# Patient Record
Sex: FEMALE | Race: BLACK OR AFRICAN AMERICAN | Employment: FULL TIME | URBAN - METROPOLITAN AREA
[De-identification: names, ages, dates, MRNs, and addresses within clinical notes are randomized per-mention and may not be internally consistent; named-entity substitution may affect disease eponyms.]

---

## 2017-11-03 PROBLEM — R10.2 PELVIC PAIN IN FEMALE: Status: ACTIVE | Noted: 2017-11-03

## 2017-11-03 PROBLEM — N94.6 DYSMENORRHEA: Status: ACTIVE | Noted: 2017-11-03

## 2018-06-21 PROBLEM — N76.0 BACTERIAL VAGINOSIS: Status: ACTIVE | Noted: 2018-06-21

## 2018-06-21 PROBLEM — B96.89 BACTERIAL VAGINOSIS: Status: ACTIVE | Noted: 2018-06-21

## 2018-06-21 PROBLEM — Z01.419 WOMEN'S ANNUAL ROUTINE GYNECOLOGICAL EXAMINATION: Status: ACTIVE | Noted: 2018-06-21

## 2018-07-10 PROBLEM — R10.31 INGUINAL PAIN, RIGHT: Status: ACTIVE | Noted: 2018-07-10

## 2018-07-10 PROBLEM — B96.89 BACTERIAL VAGINOSIS: Status: RESOLVED | Noted: 2018-06-21 | Resolved: 2018-07-10

## 2018-07-10 PROBLEM — N76.0 BACTERIAL VAGINOSIS: Status: RESOLVED | Noted: 2018-06-21 | Resolved: 2018-07-10

## 2018-08-21 PROBLEM — R10.31 INGUINAL PAIN, RIGHT: Status: RESOLVED | Noted: 2018-07-10 | Resolved: 2018-08-21

## 2018-09-13 ENCOUNTER — HOSPITAL ENCOUNTER (OUTPATIENT)
Dept: SURGERY | Age: 38
Discharge: HOME | End: 2018-09-13

## 2018-09-13 VITALS — WEIGHT: 209 LBS | BODY MASS INDEX: 29.26 KG/M2 | HEIGHT: 71 IN

## 2018-09-13 RX ORDER — NORETHINDRONE ACETATE AND ETHINYL ESTRADIOL 1; .02 MG/1; MG/1
TABLET ORAL DAILY
COMMUNITY
End: 2018-09-13

## 2018-09-13 NOTE — PERIOP NOTES
Patient verified name, , and surgery as listed in [facility]. Type 2 surgery, phone assessment complete. Orders not received. Labs per surgeon: no orders in EMR at this time  Labs per anesthesia protocol: hgb needed- Pt instructed to go to outpatient lab at Entrance B for blood draw Mon-Fri [id number] prior to day of surgery. Pt verbalizes understanding. Order placed in EMR on hold for arrival.    Patient answered medical/surgical history questions at their best of ability. All prior to admission medications documented in [facility]. Patient instructed to take the following medications the day of surgery according to anesthesia guidelines with a small sip of water: zantac, zoloft, junel, wellbutrin, tramadol PRN. Hold all vitamins 7 days prior to surgery and NSAIDS 5 days prior to surgery. Medications to be held- motrin. Patient instructed on the following:  Arrive at A Entrance, time of arrival to be called the day before by 1700  NPO after midnight including gum, mints, and ice chips  Responsible adult must drive patient to the hospital, stay during surgery, and patient will need supervision 24 hours after anesthesia  Use antibacterial soap in shower the night before surgery and on the morning of surgery  Leave all valuables (money and jewelry) at home but bring insurance card and ID on DOS  Do not wear make-up, nail polish, lotions, cologne, perfumes, powders, or oil on skin. Patient teach back successful and patient demonstrates knowledge of instruction.

## 2018-09-14 ENCOUNTER — HOSPITAL ENCOUNTER (OUTPATIENT)
Dept: LAB | Age: 38
Discharge: HOME | End: 2018-09-14
Payer: COMMERCIAL

## 2018-09-14 LAB — HGB BLD-MCNC: 12.6 G/DL (ref 11.7–15.4)

## 2018-09-14 PROCEDURE — 36415 COLL VENOUS BLD VENIPUNCTURE: CPT

## 2018-09-14 PROCEDURE — 85018 HEMOGLOBIN: CPT

## 2018-09-14 NOTE — PERIOP NOTES
Hgb result within anesthesia guidelines. Chart filed.     Recent Results (from the past 12 hour(s))   HEMOGLOBIN    Collection Time: 09/14/18 10:45 AM   Result Value Ref Range    HGB 12.6 11.7 - 15.4 g/dL

## 2018-09-17 NOTE — H&P
[facility] H&P      Assessment/Plan    Problem List  Date Reviewed: 2018          Codes Class    Women's annual routine gynecological examination ICD-10-CM: O88.807  ICD-9-CM: V72.31     Overview Signed 2018 11:40 AM by Julissa Mc MD     Pap wnl in 2017             Pelvic pain in female ICD-10-CM: R10.2  ICD-9-CM: 625.9     Overview Addendum 2018  2:59 PM by Julissa Mc MD     D/W pt that option for next step of treatment would be increase in E2 of OCP vs. Dx lap  Pt desires more definitive evaluation/treatment and would like to proceed with Dx lap    D/W pt at length risks/benefits of procedure including but not limited to death, bleeding, infection and damage to other internal organs. She exhibited full understanding and wishes to proceed. Dysmenorrhea ICD-10-CM: N94.6  ICD-9-CM: 625.3     Overview Addendum 2018  3:09 PM by Julissa Mc MD     18:  Improved with OCPs - will cont  18: Has gotten worse again  Encouraged pt to start taking ibuprofen about 1-2 days before menses begins to decrease bleeding and cramping by potentially 50%. Sanjeev Fernández Michele 41 y.o. presents today for surgical evaluation/treatment of the condition noted above. She is without any new complaints or issues.     OB History    Para Term  AB Living   2 2 2   2   SAB TAB Ectopic Molar Multiple Live Births        2      # Outcome Date GA Lbr Fahad/2nd Weight Sex Delivery Anes PTL Lv   2 Term 11   7 lb 15 oz (3.6 kg) M Vag-Spont EPI N HUSSAIN   1 Term 04/10/01   7 lb 9 oz (3.43 kg) M Vag-Spont EPI N HUSSAIN          Past Medical History:   Diagnosis Date    Allergic rhinitis     Calculus of kidney     Depression     GERD (gastroesophageal reflux disease)     managed with diet but has medication PRN    Hypercalcemia     Nausea & vomiting     occasionally    UTI (urinary tract infection)        Past Surgical History: Procedure Laterality Date    ENDOMETRIAL CRYOABLATION      HX BUNIONECTOMY Left     HX CHOLECYSTECTOMY      HX CYST REMOVAL      left hand    HX GYN      Endometrial Ablation    HX HYSTEROSCOPY WITH ENDOMETRIAL ABLATION  2015    HX OTHER SURGICAL      1/2 liver removed- 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    HX OTHER SURGICAL      ace and screws in both legs    HX PARATHYROIDECTOMY  03/13/2018    HX PARTIAL THYROIDECTOMY Left 03/13/2018    HX TONSIL AND ADENOIDECTOMY      HX TUBAL LIGATION      HX WISDOM TEETH EXTRACTION         Family History   Problem Relation Age of Onset    Hypertension Mother     Hypertension Father     Asthma Maternal Grandmother     Breast Cancer Maternal Grandmother     Other Sister      fibroids    Colon Cancer Neg Hx     Ovarian Cancer Neg Hx     Uterine Cancer Neg Hx        Social History     Social History    Marital status:      Spouse name: N/A    Number of children: N/A    Years of education: N/A     Occupational History    Not on file.      Social History Main Topics    Smoking status: Never Smoker    Smokeless tobacco: Never Used    Alcohol use Yes      Comment: 1-2 times per month    Drug use: No    Sexual activity: Yes     Birth control/ protection: Surgical, Pill      Comment: tubal     Other Topics Concern    Caffeine Concern No    Exercise No    Seat Belt Yes    Self-Exams Yes     Social History Narrative    Abuse: Feels safe at home, no history of physical abuse, no history of sexual abuse           Allergies   Allergen Reactions    Shellfish Containing Products Itching, Nausea and Vomiting and Swelling         Review of Systems:    Constitutional: No fevers or chills     CV: No chest pain or palpatations    Resp: No SOB or cough    GI: No nausea/vomiting/diarrhea/constipation    Neuro: No HA, no seizure like activity    Skin: No rashes or lesions     : No dysuria or hematuria        Objective    Visit Vitals    LMP 08/19/2018 (Exact Date)         Physical Exam    Gen: alert and cooperative, NAD    HEENT: NCAT    CV: RRR    Resp: CTA bilat    Abd: soft, NT, NABS    EXT: trace edema bilat    Gyn: done as per prev office visit    Neuro: No focal deficits    Skin: No noted rashes or lesions       Jaspreet Osborne MD  8:53 AM  09/17/18

## 2018-09-20 ENCOUNTER — ANESTHESIA EVENT (OUTPATIENT)
Dept: SURGERY | Age: 38
End: 2018-09-20
Payer: COMMERCIAL

## 2018-09-20 ENCOUNTER — ANESTHESIA (OUTPATIENT)
Dept: SURGERY | Age: 38
End: 2018-09-20
Payer: COMMERCIAL

## 2018-09-20 ENCOUNTER — HOSPITAL ENCOUNTER (OUTPATIENT)
Age: 38
Setting detail: OUTPATIENT SURGERY
Discharge: HOME | End: 2018-09-20
Attending: OBSTETRICS & GYNECOLOGY | Admitting: OBSTETRICS & GYNECOLOGY
Payer: COMMERCIAL

## 2018-09-20 VITALS
RESPIRATION RATE: 16 BRPM | TEMPERATURE: 97.7 F | DIASTOLIC BLOOD PRESSURE: 76 MMHG | OXYGEN SATURATION: 100 % | HEART RATE: 82 BPM | SYSTOLIC BLOOD PRESSURE: 120 MMHG | BODY MASS INDEX: 27.89 KG/M2 | WEIGHT: 200 LBS

## 2018-09-20 DIAGNOSIS — R10.2 PELVIC PAIN IN FEMALE: Primary | ICD-10-CM

## 2018-09-20 DIAGNOSIS — N80.30 ENDOMETRIOSIS OF PELVIC PERITONEUM: ICD-10-CM

## 2018-09-20 LAB — HCG UR QL: NEGATIVE

## 2018-09-20 PROCEDURE — 76210000006 HC OR PH I REC 0.5 TO 1 HR: Performed by: OBSTETRICS & GYNECOLOGY

## 2018-09-20 PROCEDURE — 77030020782 HC GWN BAIR PAWS FLX 3M -B: Performed by: ANESTHESIOLOGY

## 2018-09-20 PROCEDURE — 76010000160 HC OR TIME 0.5 TO 1 HR INTENSV-TIER 1: Performed by: OBSTETRICS & GYNECOLOGY

## 2018-09-20 PROCEDURE — 77030032490 HC SLV COMPR SCD KNE COVD -B: Performed by: OBSTETRICS & GYNECOLOGY

## 2018-09-20 PROCEDURE — 76060000032 HC ANESTHESIA 0.5 TO 1 HR: Performed by: OBSTETRICS & GYNECOLOGY

## 2018-09-20 PROCEDURE — 77030035029 HC NDL INSUF VERES DISP COVD -B: Performed by: OBSTETRICS & GYNECOLOGY

## 2018-09-20 PROCEDURE — 58662 LAPAROSCOPY EXCISE LESIONS: CPT | Performed by: OBSTETRICS & GYNECOLOGY

## 2018-09-20 PROCEDURE — 77030018836 HC SOL IRR NACL ICUM -A: Performed by: OBSTETRICS & GYNECOLOGY

## 2018-09-20 PROCEDURE — 77030035051: Performed by: OBSTETRICS & GYNECOLOGY

## 2018-09-20 PROCEDURE — 77030008703 HC TU ET UNCUF COVD -A: Performed by: ANESTHESIOLOGY

## 2018-09-20 PROCEDURE — 77030035048 HC TRCR ENDOSC OPTCL COVD -B: Performed by: OBSTETRICS & GYNECOLOGY

## 2018-09-20 PROCEDURE — 74011250636 HC RX REV CODE- 250/636: Performed by: ANESTHESIOLOGY

## 2018-09-20 PROCEDURE — 74011250636 HC RX REV CODE- 250/636

## 2018-09-20 PROCEDURE — 77030031139 HC SUT VCRL2 J&J -A: Performed by: OBSTETRICS & GYNECOLOGY

## 2018-09-20 PROCEDURE — 74011000250 HC RX REV CODE- 250: Performed by: OBSTETRICS & GYNECOLOGY

## 2018-09-20 PROCEDURE — 77030011502 HC MANIP UTER ZUM ZINN -B: Performed by: OBSTETRICS & GYNECOLOGY

## 2018-09-20 PROCEDURE — 77030039425 HC BLD LARYNG TRULITE DISP TELE -A: Performed by: ANESTHESIOLOGY

## 2018-09-20 PROCEDURE — 77030008477 HC STYL SATN SLP COVD -A: Performed by: ANESTHESIOLOGY

## 2018-09-20 PROCEDURE — 77030008522 HC TBNG INSUF LAPRO STRY -B: Performed by: OBSTETRICS & GYNECOLOGY

## 2018-09-20 PROCEDURE — 74011000250 HC RX REV CODE- 250

## 2018-09-20 PROCEDURE — 81025 URINE PREGNANCY TEST: CPT

## 2018-09-20 RX ORDER — SODIUM CHLORIDE 0.9 % (FLUSH) 0.9 %
5-10 SYRINGE (ML) INJECTION AS NEEDED
Status: DISCONTINUED | OUTPATIENT
Start: 2018-09-20 | End: 2018-09-20 | Stop reason: HOSPADM

## 2018-09-20 RX ORDER — ONDANSETRON 2 MG/ML
INJECTION INTRAMUSCULAR; INTRAVENOUS AS NEEDED
Status: DISCONTINUED | OUTPATIENT
Start: 2018-09-20 | End: 2018-09-20 | Stop reason: HOSPADM

## 2018-09-20 RX ORDER — HYDROCODONE BITARTRATE AND ACETAMINOPHEN 5; 325 MG/1; MG/1
1 TABLET ORAL
Qty: 20 TAB | Refills: 0 | Status: SHIPPED | OUTPATIENT
Start: 2018-09-20 | End: 2018-11-03

## 2018-09-20 RX ORDER — SODIUM CHLORIDE, SODIUM LACTATE, POTASSIUM CHLORIDE, CALCIUM CHLORIDE 600; 310; 30; 20 MG/100ML; MG/100ML; MG/100ML; MG/100ML
75 INJECTION, SOLUTION INTRAVENOUS CONTINUOUS
Status: DISCONTINUED | OUTPATIENT
Start: 2018-09-20 | End: 2018-09-20 | Stop reason: HOSPADM

## 2018-09-20 RX ORDER — SODIUM CHLORIDE 0.9 % (FLUSH) 0.9 %
5-10 SYRINGE (ML) INJECTION EVERY 8 HOURS
Status: DISCONTINUED | OUTPATIENT
Start: 2018-09-20 | End: 2018-09-20 | Stop reason: HOSPADM

## 2018-09-20 RX ORDER — LIDOCAINE HYDROCHLORIDE 10 MG/ML
0.1 INJECTION INFILTRATION; PERINEURAL AS NEEDED
Status: DISCONTINUED | OUTPATIENT
Start: 2018-09-20 | End: 2018-09-20 | Stop reason: HOSPADM

## 2018-09-20 RX ORDER — OXYCODONE AND ACETAMINOPHEN 5; 325 MG/1; MG/1
1 TABLET ORAL AS NEEDED
Status: DISCONTINUED | OUTPATIENT
Start: 2018-09-20 | End: 2018-09-20 | Stop reason: HOSPADM

## 2018-09-20 RX ORDER — BUPIVACAINE HYDROCHLORIDE 5 MG/ML
INJECTION, SOLUTION EPIDURAL; INTRACAUDAL AS NEEDED
Status: DISCONTINUED | OUTPATIENT
Start: 2018-09-20 | End: 2018-09-20 | Stop reason: HOSPADM

## 2018-09-20 RX ORDER — FENTANYL CITRATE 50 UG/ML
INJECTION, SOLUTION INTRAMUSCULAR; INTRAVENOUS AS NEEDED
Status: DISCONTINUED | OUTPATIENT
Start: 2018-09-20 | End: 2018-09-20 | Stop reason: HOSPADM

## 2018-09-20 RX ORDER — MIDAZOLAM HYDROCHLORIDE 1 MG/ML
2 INJECTION, SOLUTION INTRAMUSCULAR; INTRAVENOUS
Status: DISCONTINUED | OUTPATIENT
Start: 2018-09-20 | End: 2018-09-20 | Stop reason: HOSPADM

## 2018-09-20 RX ORDER — DEXTROSE, SODIUM CHLORIDE, SODIUM LACTATE, POTASSIUM CHLORIDE, AND CALCIUM CHLORIDE 5; .6; .31; .03; .02 G/100ML; G/100ML; G/100ML; G/100ML; G/100ML
150 INJECTION, SOLUTION INTRAVENOUS CONTINUOUS
Status: DISCONTINUED | OUTPATIENT
Start: 2018-09-20 | End: 2018-09-20 | Stop reason: HOSPADM

## 2018-09-20 RX ORDER — HYDROMORPHONE HYDROCHLORIDE 2 MG/ML
0.5 INJECTION, SOLUTION INTRAMUSCULAR; INTRAVENOUS; SUBCUTANEOUS
Status: DISCONTINUED | OUTPATIENT
Start: 2018-09-20 | End: 2018-09-20 | Stop reason: HOSPADM

## 2018-09-20 RX ORDER — NALOXONE HYDROCHLORIDE 0.4 MG/ML
0.2 INJECTION, SOLUTION INTRAMUSCULAR; INTRAVENOUS; SUBCUTANEOUS AS NEEDED
Status: DISCONTINUED | OUTPATIENT
Start: 2018-09-20 | End: 2018-09-20 | Stop reason: HOSPADM

## 2018-09-20 RX ORDER — LIDOCAINE HYDROCHLORIDE 20 MG/ML
INJECTION, SOLUTION EPIDURAL; INFILTRATION; INTRACAUDAL; PERINEURAL AS NEEDED
Status: DISCONTINUED | OUTPATIENT
Start: 2018-09-20 | End: 2018-09-20 | Stop reason: HOSPADM

## 2018-09-20 RX ORDER — ROCURONIUM BROMIDE 10 MG/ML
INJECTION, SOLUTION INTRAVENOUS AS NEEDED
Status: DISCONTINUED | OUTPATIENT
Start: 2018-09-20 | End: 2018-09-20 | Stop reason: HOSPADM

## 2018-09-20 RX ORDER — PROPOFOL 10 MG/ML
INJECTION, EMULSION INTRAVENOUS AS NEEDED
Status: DISCONTINUED | OUTPATIENT
Start: 2018-09-20 | End: 2018-09-20 | Stop reason: HOSPADM

## 2018-09-20 RX ORDER — DEXAMETHASONE SODIUM PHOSPHATE 4 MG/ML
INJECTION, SOLUTION INTRA-ARTICULAR; INTRALESIONAL; INTRAMUSCULAR; INTRAVENOUS; SOFT TISSUE AS NEEDED
Status: DISCONTINUED | OUTPATIENT
Start: 2018-09-20 | End: 2018-09-20 | Stop reason: HOSPADM

## 2018-09-20 RX ADMIN — PROPOFOL 200 MG: 10 INJECTION, EMULSION INTRAVENOUS at 11:31

## 2018-09-20 RX ADMIN — HYDROMORPHONE HYDROCHLORIDE 0.5 MG: 2 INJECTION, SOLUTION INTRAMUSCULAR; INTRAVENOUS; SUBCUTANEOUS at 12:36

## 2018-09-20 RX ADMIN — FENTANYL CITRATE 100 MCG: 50 INJECTION, SOLUTION INTRAMUSCULAR; INTRAVENOUS at 11:31

## 2018-09-20 RX ADMIN — ROCURONIUM BROMIDE 50 MG: 10 INJECTION, SOLUTION INTRAVENOUS at 11:31

## 2018-09-20 RX ADMIN — HYDROMORPHONE HYDROCHLORIDE 0.5 MG: 2 INJECTION, SOLUTION INTRAMUSCULAR; INTRAVENOUS; SUBCUTANEOUS at 12:41

## 2018-09-20 RX ADMIN — SODIUM CHLORIDE, SODIUM LACTATE, POTASSIUM CHLORIDE, AND CALCIUM CHLORIDE: 600; 310; 30; 20 INJECTION, SOLUTION INTRAVENOUS at 11:48

## 2018-09-20 RX ADMIN — LIDOCAINE HYDROCHLORIDE 60 MG: 20 INJECTION, SOLUTION EPIDURAL; INFILTRATION; INTRACAUDAL; PERINEURAL at 11:31

## 2018-09-20 RX ADMIN — DEXAMETHASONE SODIUM PHOSPHATE 10 MG: 4 INJECTION, SOLUTION INTRA-ARTICULAR; INTRALESIONAL; INTRAMUSCULAR; INTRAVENOUS; SOFT TISSUE at 11:35

## 2018-09-20 RX ADMIN — SODIUM CHLORIDE, SODIUM LACTATE, POTASSIUM CHLORIDE, AND CALCIUM CHLORIDE 75 ML/HR: 600; 310; 30; 20 INJECTION, SOLUTION INTRAVENOUS at 10:06

## 2018-09-20 RX ADMIN — ONDANSETRON 4 MG: 2 INJECTION INTRAMUSCULAR; INTRAVENOUS at 11:35

## 2018-09-20 NOTE — PERIOP NOTES
[address] September 20, 2018       RE: Danie Miles      To Whom It May Concern,    This is to certify that Danie Miles may may return to work on Monday, 9/24/18 per Dr. Ardon Has. Please feel free to contact my office if you have any questions or concerns. Thank you for your assistance in this matter.       Sincerely,  Jacey Clancy RN/ Telephone order Dr. Ardon Has

## 2018-09-20 NOTE — OP NOTES
Joshua Spain    1980        Preop Dx:   1. Pelvic pain    2. Dysmenorrhea      Postop Dx:   Same    3. Endometriosis with suspected adenomyosis      Procedure:   1. Diagnostic laparoscopy    2. Fulguration of endometriosis      Surgeon:  Earl Valentine        Anesthesia:  GETA      EBL:  5 mL     IVF:  900 mL     UOP:  390 mL      Complications:  None      Findings:  Irregularly shaped uterus with blotchy serosa throughout C/W adenomyosis. Bilat tubal changes C/W prev BTL. Nml adenxa bilat. Small erythematous lesions over left ulterosacral ligament C/W endometriosis. Procedure:  Patient was taken to the operating room where GETA anesthesia was found to be adequate. She was prepped and draped in the usual sterile fashion and placed in the dorsal lithotomy position in the [employer] A weighted speculum was placed in the patient's vagina and anterior lip on the cervix grasped with a single toothed tenaculum. Cervix was sequentially dilated with Hegar dilators to a #8. Bonny manipulator was introduced in the usual fashion without difficulty. Attention was then turned to the patient's abdomen. After local infiltration with 0.5% marcaine, an infraumbilical skin incision was made with the scalpel. Veress needle was introduced. Intraabdominal placement confirmed with drop in pressure. Pneumopretioneum was then obtained with insufflation of CO2 gas. Bladeless 5 mm trocar was placed intraabdominally under direct visualization with the laparoscope. Survey of the patient's pelvis and abdomen revealed the above findings. A second 5 mm trocar was placed in the midline suprapubically under direct visualization with the laparoscope. Monopolar scissors used to fulgurate left Us ligament. All instruments removed from the patient's abdomen. The trocar sites were closed with 4-0 monocryl in subcuticular fashion. Patient tolerated procedure well. Sponge, lap and needle counts correct x 3.       Disposition: Pt to RR in stable condition      Pathology: none      Ebenezer Corea MD   12:13 PM  09/20/18

## 2018-09-20 NOTE — IP AVS SNAPSHOT
[address] [place] [address] [place] [address] 
[phone] Patient: Kat Renner MRN: [id number] KVY:[id number][birth year] About your hospitalization You were admitted on:  September 20, 2018 You last received care in the:  [facility] PACU You were discharged on:  September 20, 2018 Why you were hospitalized Your primary diagnosis was:  Not on File Your diagnoses also included:  Pelvic Pain Follow-up Information Follow up With Details Comments Contact Info [address] [address] 
[phone] Cristofer Stewart MD Follow up in 2 week(s)  [address] [place] [address] [facility] [address] 
[phone] Your Scheduled Appointments Thursday October 04, 2018 10:15 AM EDT  
EST GYN with Cristofer Stewart MD  
[facility] ([facility]) [address] [place] [address] [place] [id number] [phone] Discharge Orders None A check rio indicates which time of day the medication should be taken. My Medications START taking these medications Instructions Each Dose to Equal  
 Morning Noon Evening Bedtime HYDROcodone-acetaminophen 5-325 mg per tablet Commonly known as:  Cali Smallwood Your last dose was: Your next dose is: Take 1 Tab by mouth every six (6) hours as needed for Pain. Max Daily Amount: 4 Tabs. 1 Tab CONTINUE taking these medications Instructions Each Dose to Equal  
 Morning Noon Evening Bedtime buPROPion  mg tablet Commonly known as:  Shakira De Leon Your last dose was: Your next dose is: Take 150 mg by mouth daily. 150 mg  
    
   
   
   
  
 ibuprofen 800 mg tablet Commonly known as:  MOTRIN Your last dose was: Your next dose is:    
   
   
 as needed. norethindrone-e estradiol-iron 1 mg-20 mcg (24)/75 mg (4) Tab Commonly known as:  LOESTRIN FE Your last dose was: Your next dose is: Take 1 Tab by mouth daily. 1 Tab  
    
   
   
   
  
 raNITIdine 150 mg tablet Commonly known as:  ZANTAC Your last dose was: Your next dose is: Take 150 mg by mouth as needed. 150 mg  
    
   
   
   
  
 sertraline 50 mg tablet Commonly known as:  ZOLOFT Your last dose was: Your next dose is:    
   
   
 1 TAB(S) ORALLY ONCE A DAY FOR 30 DAY(S)  
     
   
   
   
  
 traMADol 50 mg tablet Commonly known as:  ULTRAM  
   
Your last dose was: Your next dose is:    
   
   
 as needed. Where to Get Your Medications Information on where to get these meds will be given to you by the nurse or doctor. ! Ask your nurse or doctor about these medications HYDROcodone-acetaminophen 5-325 mg per tablet Opioid Education Prescription Opioids: What You Need to Know: 
 
 
 
ACTIVITY  Limit activity today; increase activity tomorrow, but no vigorous exercise  Shower only; no tub baths  No douches, tampons or intercourse until your doctor releases you (at least 2 weeks)  May return to work or school as directed by your doctor DIET  Clear liquids until no nausea or vomiting  Advance to regular diet as tolerated PAIN 
 Expect a moderate amount of pain.  Take pain medication as directed by your doctor.  If no prescription for pain is sent home with you, take the appropriate dose of your commonly used pain medication.  Call you doctor if pain is NOT relieved by medication.  DO NOT take aspirin or blood thinners until directed by your doctor. DRESSING CARE *** Does Not Apply  Change dressing / band aids as directed by your doctor. FOLLOW PHONE CALLS * Calls will be made by nursing staff.  If you have any problems or concerns, call your doctor as needed. CALL YOUR DOCTOR IF 
 Excessive bleeding that does not stop after holding mild pressure over the area for 15 minutes  You soak a pad in an hour  Temperature of 101°F or above  Green or yellow, smelly drainage or discharge  You are unable to urinate by bedtime  Nausea and vomiting that does not stop by bedtime AFTER ANESTHESIA  For the next 24 hours: DO NOT Drive, Drink alcoholic beverages, or Make important decisions.  Be aware of dizziness following anesthesia and while taking pain medication.  Plan to stay tonight within 1 hours drive of the hospital. 
 
 
 
  
  
  
Introducing [facility]! Dear Enrique Avila: Thank you for requesting a Ameibo account. Our records indicate that you already have an active Ameibo account. You can access your account anytime at [email or web address] Did you know that you can access your hospital and ER discharge instructions at any time in Ameibo? You can also review all of your test results from your hospital stay or ER visit. Additional Information If you have questions, please visit the Frequently Asked Questions section of the Ameibo website at [email or web address] Remember, Ameibo is NOT to be used for urgent needs. For medical emergencies, dial 911. Now available from your iPhone and Android! Introducing Michel Rolon As a [facility] patient, I wanted to make you aware of our electronic visit tool called Michel Rolon. [employer] 24/7 allows you to connect within minutes with a medical provider 24 hours a day, seven days a week via a mobile device or tablet or logging into a secure website from your computer. You can access [email or web address] from anywhere in the [place]. A virtual visit might be right for you when you have a simple condition and feel like you just dont want to get out of bed, or cant get away from work for an appointment, when your regular [employer] provider is not available (evenings, weekends or holidays), or when youre out of town and need minor care. Electronic visits cost only $49 and if the [employer] 24/7 provider determines a prescription is needed to treat your condition, one can be electronically transmitted to a nearby pharmacy*. Please take a moment to enroll today if you have not already done so. The enrollment process is free and takes just a few minutes. To enroll, please download the [employer] 24/7 tari to your tablet or phone, or visit [email or web address] to enroll on your computer. And, as an [address] [place] [address] patient with a [employer] account, the results of your visits will be scanned into your electronic medical record and your primary care provider will be able to view the scanned results. We urge you to continue to see your regular [employer] provider for your ongoing medical care. And while your primary care provider may not be the one available when you seek a [employer] virtual visit, the peace of mind you get from getting a real diagnosis real time can be priceless. For more information on [employer], view our Frequently Asked Questions (FAQs) at [email or web address]. Sincerely, 
 
Alanna Tsang MD 
Chief Medical Officer [employer] *:  certain medications cannot be prescribed via [email or web address] Providers Seen During Your Hospitalization Provider Specialty Primary office phone Ebenezer Corea MD Obstetrics & Gynecology [phone] Your Primary Care Physician (PCP) Primary Care Physician Office Phone Office Fax Charissa June [phone] You are allergic to the following Allergen Reactions Shellfish Containing Products Itching Nausea and Vomiting Swelling Recent Documentation Weight BMI OB Status Smoking Status 90.7 kg 27.89 kg/m2 Ablation Never Smoker Emergency Contacts Name Discharge Info Relation Home Work Mobile Yared Chavez DISCHARGE CAREGIVER [3] Other Relative [6] [phone] Patient Belongings The following personal items are in your possession at time of discharge: 
  Dental Appliances: None Please provide this summary of care documentation to your next provider. Signatures-by signing, you are acknowledging that this After Visit Summary has been reviewed with you and you have received a copy. Patient Signature:  ____________________________________________________________ Date:  ____________________________________________________________  
  
Josph Perfect Provider Signature:  ____________________________________________________________ Date:  ____________________________________________________________

## 2018-09-20 NOTE — DISCHARGE INSTRUCTIONS
INSTRUCTIONS FOLLOWING GYN LAPAROSCOPY      ACTIVITY   Limit activity today; increase activity tomorrow, but no vigorous exercise   Shower only; no tub baths   No douches, tampons or intercourse until your doctor releases you (at least 2 weeks)   May return to work or school as directed by your doctor    DIET   Clear liquids until no nausea or vomiting   Advance to regular diet as tolerated    PAIN   Expect a moderate amount of pain.  Take pain medication as directed by your doctor. If no prescription for pain is sent home with you, take the appropriate dose of your commonly used pain medication.  Call you doctor if pain is NOT relieved by medication.  DO NOT take aspirin or blood thinners until directed by your doctor. DRESSING CARE  Does Not Apply   Change dressing / band aids as directed by your doctor. FOLLOW PHONE [facility] will be made by nursing staff.  If you have any problems or concerns, call your doctor as needed. CALL YOUR DOCTOR IF   Excessive bleeding that does not stop after holding mild pressure over the area for 15 minutes   You soak a pad in an hour   Temperature of 101°F or above   Green or yellow, smelly drainage or discharge   You are unable to urinate by bedtime   Nausea and vomiting that does not stop by bedtime    AFTER ANESTHESIA   For the next 24 hours: DO NOT Drive, Drink alcoholic beverages, or Make important decisions.  Be aware of dizziness following anesthesia and while taking pain medication.    Plan to stay tonight within 1 hours drive of the hospital.

## 2018-09-20 NOTE — ANESTHESIA POSTPROCEDURE EVALUATION
Post-Anesthesia Evaluation and Assessment Patient: Patsy Miranda MRN: [id number]  SSN: [id number] YOB: 1980  Age: 41 y.o. Sex: female Cardiovascular Function/Vital Signs Visit Vitals  /76  Pulse 82  Temp 36.5 °C (97.7 °F)  Resp 16  Wt 90.7 kg (200 lb)  SpO2 100%  BMI 27.89 kg/m2 Patient is status post general anesthesia for Procedure(s): LAPAROSCOPY GYN DIAGNOSTIC. Nausea/Vomiting: None Postoperative hydration reviewed and adequate. Pain: 
Pain Scale 1: Numeric (0 - 10) (09/20/18 1315) Pain Intensity 1: 1 (09/20/18 1315) Managed Neurological Status:  
Neuro (WDL): Within Defined Limits (09/20/18 1315) Neuro Neurologic State: Alert (09/20/18 1315) Orientation Level: Oriented X4 (09/20/18 1315) Cognition: Follows commands (09/20/18 1315) Speech: Clear (09/20/18 1315) LUE Motor Response: Purposeful (09/20/18 1315) LLE Motor Response: Purposeful (09/20/18 1315) RUE Motor Response: Purposeful (09/20/18 1315) RLE Motor Response: Purposeful (09/20/18 1315) At baseline Mental Status and Level of Consciousness: Arousable Pulmonary Status:  
O2 Device: Room air (09/20/18 1315) Adequate oxygenation and airway patent Complications related to anesthesia: None Post-anesthesia assessment completed. No concerns Signed By: Emperatriz Bhatti MD   
 September 20, 2018

## 2018-09-20 NOTE — ANESTHESIA PREPROCEDURE EVALUATION
Anesthetic History PONV Review of Systems / Medical History Patient summary reviewed, nursing notes reviewed and pertinent labs reviewed Pulmonary Within defined limits Neuro/Psych Within defined limits Cardiovascular Exercise tolerance: >4 METS 
  
GI/Hepatic/Renal 
  
GERD: well controlled Endo/Other Comments: H/O partial thyroidectomy Other Findings Physical Exam 
 
Airway Mallampati: II 
TM Distance: 4 - 6 cm Neck ROM: normal range of motion Mouth opening: Normal 
 
 Cardiovascular Rhythm: regular Dental 
No notable dental hx Pulmonary Breath sounds clear to auscultation Abdominal 
GI exam deferred Other Findings Anesthetic Plan ASA: 2 Anesthesia type: general 
 
 
 
 
Induction: Intravenous Anesthetic plan and risks discussed with: Patient

## 2018-10-04 PROBLEM — R10.2 PELVIC PAIN: Status: RESOLVED | Noted: 2018-09-20 | Resolved: 2018-10-04

## 2018-10-04 PROBLEM — N80.9 ENDOMETRIOSIS DETERMINED BY LAPAROSCOPY: Status: ACTIVE | Noted: 2018-10-04

## 2018-10-04 PROBLEM — N80.00 UTERUS, ADENOMYOSIS: Status: ACTIVE | Noted: 2018-10-04

## 2018-10-25 ENCOUNTER — HOSPITAL ENCOUNTER (OUTPATIENT)
Dept: SURGERY | Age: 38
Discharge: HOME | End: 2018-10-25
Payer: COMMERCIAL

## 2018-10-25 VITALS
TEMPERATURE: 96.3 F | HEART RATE: 89 BPM | BODY MASS INDEX: 28.56 KG/M2 | HEIGHT: 71 IN | OXYGEN SATURATION: 96 % | SYSTOLIC BLOOD PRESSURE: 122 MMHG | DIASTOLIC BLOOD PRESSURE: 83 MMHG | RESPIRATION RATE: 16 BRPM | WEIGHT: 204 LBS

## 2018-10-25 LAB — HGB BLD-MCNC: 12.5 G/DL (ref 11.7–15.4)

## 2018-10-25 PROCEDURE — 85018 HEMOGLOBIN: CPT

## 2018-10-25 RX ORDER — TRAZODONE HYDROCHLORIDE 100 MG/1
100 TABLET ORAL
COMMUNITY
Start: 2018-10-24 | End: 2018-11-23

## 2018-10-25 RX ORDER — SERTRALINE HYDROCHLORIDE 100 MG/1
100 TABLET, FILM COATED ORAL
COMMUNITY
Start: 2018-10-24 | End: 2018-11-23

## 2018-10-25 RX ORDER — DIVALPROEX SODIUM 125 MG/1
125 TABLET, DELAYED RELEASE ORAL 2 TIMES DAILY
COMMUNITY
Start: 2018-10-24 | End: 2018-11-23

## 2018-10-25 NOTE — PERIOP NOTES
Patient verified name and     Order for consent NOT found in EHR and matches case posting; patient verified. Type 2 surgery, walk in assessment complete. Labs per surgeon: none;   Labs per anesthesia protocol: hemoglobin; results 12.5 and within anesthesia guidelines; routed via fax to PCP, Dr Dickson Beltran and to surgeon, Dr. General Chapin  EKG: none needed per protocol    Hibiclens and instructions given per hospital policy. Patient provided with and instructed on educational handouts including Guide to Surgery, Pain Management, Hand Hygiene, Blood Transfusion Education, and [place] Anesthesia Brochure. Patient answered medical/surgical history questions at their best of ability. All prior to admission medications documented in [facility]. Original medication prescription bottle NOT visualized during patient appointment. Patient instructed to hold all vitamins 7 days prior to surgery and NSAIDS 5 days prior to surgery, patient verbalized understanding. Medications to be held: Ibuprofen hold for 5 days prior to surgery    Patient instructed to continue previous medications as prescribed prior to surgery and to take the following medications the day of surgery according to anesthesia guidelines with a small sip of water: Depakote; Zantac. Patient teach back successful and patient demonstrates knowledge of instructions.

## 2018-10-25 NOTE — PERIOP NOTES
Recent Results (from the past 8 hour(s))   HEMOGLOBIN    Collection Time: 10/25/18  9:15 AM   Result Value Ref Range    HGB 12.5 11.7 - 15.4 g/dL

## 2018-10-30 RX ORDER — DEXTROSE, SODIUM CHLORIDE, SODIUM LACTATE, POTASSIUM CHLORIDE, AND CALCIUM CHLORIDE 5; .6; .31; .03; .02 G/100ML; G/100ML; G/100ML; G/100ML; G/100ML
150 INJECTION, SOLUTION INTRAVENOUS CONTINUOUS
Status: CANCELLED | OUTPATIENT
Start: 2018-10-30 | End: 2018-10-31

## 2018-10-30 NOTE — H&P
[facility] H&P Assessment/Plan Problem List  Date Reviewed: 9/20/2018 Codes Class Uterus, adenomyosis ICD-10-CM: N80.0 ICD-9-CM: 617.0 Endometriosis determined by laparoscopy ICD-10-CM: N80.9 ICD-9-CM: 617.9 Women's annual routine gynecological examination ICD-10-CM: P64.009 ICD-9-CM: V72.31 Overview Signed 6/21/2018 11:40 AM by Isac Carrillo MD  
  Pap wnl in 2017 Pelvic pain in female ICD-10-CM: R10.2 ICD-9-CM: 625.9 Overview Addendum 8/21/2018  2:59 PM by Isac Carrillo MD  
  D/W pt that option for next step of treatment would be increase in E2 of OCP vs. Dx lap Pt desires more definitive evaluation/treatment and would like to proceed with Dx lap D/W pt at length risks/benefits of procedure including but not limited to death, bleeding, infection and damage to other internal organs. She exhibited full understanding and wishes to proceed. Dysmenorrhea ICD-10-CM: N94.6 ICD-9-CM: 625.3 Overview Addendum 8/21/2018  3:09 PM by Isac Carrillo MD  
  4/20/18:  Improved with OCPs - will cont 8/21/18: Has gotten worse again Encouraged pt to start taking ibuprofen about 1-2 days before menses begins to decrease bleeding and cramping by potentially 50%. Sanjeev Dobbins 41 y.o. presents today for surgical evaluation/treatment of the condition noted above. She is without any new complaints or issues. PLAN:  [address] with bilateral salpingectomies D/W pt that in light of endometriosis treament, studies have shown that hysterectomy without removal of ovaries portends a 50-60% chance of return of pelvic pain.   Removal of ovaries at time of hysterectomy also has a pain recurrence of approximately 20-30%, along with other issues including but not limited to: difficulty in HRT, increase risk of osteoporosis and CAD.  Also D/W pt that these procedures do NOT guarantee that pain will be resolved at all. D/W pt at length risks/benefits of procedure including but not limited to death, bleeding, infection and damage to other internal organs. She exhibited full understanding and wishes to proceed. OB History  Para Term  AB Living 2 2 2     2 SAB TAB Ectopic Molar Multiple Live Births 2  
  
# Outcome Date GA Lbr Fahad/2nd Weight Sex Delivery Anes PTL Lv  
2 Term 11   7 lb 15 oz (3.6 kg) M Vag-Spont EPI N HUSSAIN  
1 Term 04/10/01   7 lb 9 oz (3.43 kg) M Vag-Spont EPI N HUSSAIN Past Medical History:  
Diagnosis Date  Allergic rhinitis  Calculus of kidney  Depression  GERD (gastroesophageal reflux disease)   
 managed with diet but has medication PRN  
 Hypercalcemia  Nausea & vomiting   
 in PACU; states the one time she did not get sick in PACU was when she had a scopolamine patch behind ear  Thyroid disease   
 had partial thyroidectomy  UTI (urinary tract infection) Past Surgical History:  
Procedure Laterality Date  ENDOMETRIAL CRYOABLATION    
 HX BUNIONECTOMY Left  HX CHOLECYSTECTOMY  HX CYST REMOVAL    
 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    
 ace and screws in both legs  HX PARATHYROIDECTOMY  2018  HX PARTIAL THYROIDECTOMY Left 2018  HX TONSIL AND ADENOIDECTOMY  HX TUBAL LIGATION    
 HX WISDOM TEETH EXTRACTION Family History Problem Relation Age of Onset  Hypertension Mother  Hypertension Father  Asthma Maternal Grandmother  Breast Cancer Maternal Grandmother  Other Sister   
     fibroids  Colon Cancer Neg Hx   
 Ovarian Cancer Neg Hx  Uterine Cancer Neg Hx Social History Socioeconomic History  Marital status:  Spouse name: Not on file  Number of children: Not on file  Years of education: Not on file  Highest education level: Not on file Social Needs  Financial resource strain: Not on file  Food insecurity - worry: Not on file  Food insecurity - inability: Not on file  Transportation needs - medical: Not on file  Transportation needs - non-medical: Not on file Occupational History  Not on file Tobacco Use  Smoking status: Never Smoker  Smokeless tobacco: Never Used Substance and Sexual Activity  Alcohol use: Yes Comment: 1-2 times per month  Drug use: No  
 Sexual activity: Yes Birth control/protection: Surgical, Pill Comment: tubal  
Other Topics Concern [address] [employer] Not Asked  Blood Transfusions Not Asked  Caffeine Concern No  
 Occupational Exposure Not Asked [facility] Hazards Not Asked  Sleep Concern Not Asked  Stress Concern Not Asked  Weight Concern Not Asked  Special Diet Not Asked  Back Care Not Asked  Exercise No  
 Bike Helmet Not Asked [address] Yes  Self-Exams Yes Social History Narrative Abuse: Feels safe at home, no history of physical abuse, no history of sexual abuse Allergies Allergen Reactions  Shellfish Containing Products Itching, Nausea and Vomiting and Swelling Review of Systems: 
 
Constitutional: No fevers or chills CV: No chest pain or palpatations Resp: No SOB or cough GI: No nausea/vomiting/diarrhea/constipation Neuro: No HA, no seizure like activity Skin: No rashes or lesions : No dysuria or hematuria Objective Visit Vitals LMP 10/01/2018 (Approximate) Physical Exam 
 
Gen: alert and cooperative, NAD HEENT: NCAT 
 
CV: RRR Resp: CTA bilat Abd: soft, NT, NABS 
 
EXT: trace edema bilat Gyn: done as per prev office visit Neuro: No focal deficits Skin: No noted rashes or lesions Trini Galindo MD 
8:43 AM 
10/30/18

## 2018-11-01 ENCOUNTER — ANESTHESIA EVENT (OUTPATIENT)
Dept: SURGERY | Age: 38
End: 2018-11-01
Payer: COMMERCIAL

## 2018-11-01 NOTE — ANESTHESIA PREPROCEDURE EVALUATION
Anesthetic History PONV (every time) Comments: Sore throat X 2 wks p laparoscopy Review of Systems / Medical History Patient summary reviewed, nursing notes reviewed and pertinent labs reviewed Pulmonary Neuro/Psych Cardiovascular Exercise tolerance: >4 METS 
  
GI/Hepatic/Renal 
  
GERD Renal disease: stones Endo/Other Hypothyroidism: well controlled Other Findings Physical Exam 
 
Airway Mallampati: I 
TM Distance: 4 - 6 cm Neck ROM: normal range of motion Mouth opening: Normal 
 
 Cardiovascular Regular rate and rhythm,  S1 and S2 normal,  no murmur, click, rub, or gallop Dental 
No notable dental hx Pulmonary Breath sounds clear to auscultation Abdominal 
GI exam deferred Other Findings Anesthetic Plan ASA: 1 Anesthesia type: general 
 
 
 
 
 
Anesthetic plan and risks discussed with: Patient and Spouse

## 2018-11-02 ENCOUNTER — ANESTHESIA (OUTPATIENT)
Dept: SURGERY | Age: 38
End: 2018-11-02
Payer: COMMERCIAL

## 2018-11-02 ENCOUNTER — HOSPITAL ENCOUNTER (OUTPATIENT)
Age: 38
Setting detail: OBSERVATION
Discharge: HOME | End: 2018-11-03
Attending: OBSTETRICS & GYNECOLOGY | Admitting: OBSTETRICS & GYNECOLOGY
Payer: COMMERCIAL

## 2018-11-02 DIAGNOSIS — R10.2 PELVIC PAIN IN FEMALE: Primary | ICD-10-CM

## 2018-11-02 DIAGNOSIS — N94.6 DYSMENORRHEA: ICD-10-CM

## 2018-11-02 DIAGNOSIS — Z90.710 S/P HYSTERECTOMY: ICD-10-CM

## 2018-11-02 DIAGNOSIS — N80.9 ENDOMETRIOSIS DETERMINED BY LAPAROSCOPY: ICD-10-CM

## 2018-11-02 DIAGNOSIS — N80.00 UTERUS, ADENOMYOSIS: ICD-10-CM

## 2018-11-02 LAB
ABO + RH BLD: NORMAL
BLOOD GROUP ANTIBODIES SERPL: NORMAL
HCG UR QL: NEGATIVE
SPECIMEN EXP DATE BLD: NORMAL

## 2018-11-02 PROCEDURE — 77030018778 HC MANIP UTER VCAR CNMD -B: Performed by: OBSTETRICS & GYNECOLOGY

## 2018-11-02 PROCEDURE — 77030013532 HC SEAL FBRN TISSL RDYTUSE 4ML BAXT -C: Performed by: OBSTETRICS & GYNECOLOGY

## 2018-11-02 PROCEDURE — 99218 HC RM OBSERVATION: CPT

## 2018-11-02 PROCEDURE — 74011250637 HC RX REV CODE- 250/637

## 2018-11-02 PROCEDURE — 77030035044 HC TRCR ENDOSC VRSPRT BLDLSS COVD -C: Performed by: OBSTETRICS & GYNECOLOGY

## 2018-11-02 PROCEDURE — 74011250637 HC RX REV CODE- 250/637: Performed by: OBSTETRICS & GYNECOLOGY

## 2018-11-02 PROCEDURE — 81025 URINE PREGNANCY TEST: CPT

## 2018-11-02 PROCEDURE — 77030019908 HC STETH ESOPH SIMS -A: Performed by: NURSE ANESTHETIST, CERTIFIED REGISTERED

## 2018-11-02 PROCEDURE — 77030032490 HC SLV COMPR SCD KNE COVD -B: Performed by: OBSTETRICS & GYNECOLOGY

## 2018-11-02 PROCEDURE — 77030031139 HC SUT VCRL2 J&J -A: Performed by: OBSTETRICS & GYNECOLOGY

## 2018-11-02 PROCEDURE — 74011000250 HC RX REV CODE- 250: Performed by: OBSTETRICS & GYNECOLOGY

## 2018-11-02 PROCEDURE — 77030013252 HC APPL DUPLOSPRY BAXT -B: Performed by: OBSTETRICS & GYNECOLOGY

## 2018-11-02 PROCEDURE — 77030008756 HC TU IRR SUC STRY -B: Performed by: OBSTETRICS & GYNECOLOGY

## 2018-11-02 PROCEDURE — 74011250636 HC RX REV CODE- 250/636

## 2018-11-02 PROCEDURE — 77030022704 HC SUT VLOC COVD -B: Performed by: OBSTETRICS & GYNECOLOGY

## 2018-11-02 PROCEDURE — 88307 TISSUE EXAM BY PATHOLOGIST: CPT

## 2018-11-02 PROCEDURE — 74011250637 HC RX REV CODE- 250/637: Performed by: ANESTHESIOLOGY

## 2018-11-02 PROCEDURE — 77030031492 HC PRT ACC BLNT AIRSEAL CNMD -B: Performed by: OBSTETRICS & GYNECOLOGY

## 2018-11-02 PROCEDURE — 77030035277 HC OBTRTR BLDELSS DISP INTU -B: Performed by: OBSTETRICS & GYNECOLOGY

## 2018-11-02 PROCEDURE — 74011250636 HC RX REV CODE- 250/636: Performed by: ANESTHESIOLOGY

## 2018-11-02 PROCEDURE — 77030020255 HC SOL INJ LR 1000ML BG

## 2018-11-02 PROCEDURE — 77030003666 HC NDL SPINAL BD -A: Performed by: OBSTETRICS & GYNECOLOGY

## 2018-11-02 PROCEDURE — 77030016151 HC PROTCTR LNS DFOG COVD -B: Performed by: OBSTETRICS & GYNECOLOGY

## 2018-11-02 PROCEDURE — 77030008771 HC TU NG SALEM SUMP -A: Performed by: ANESTHESIOLOGY

## 2018-11-02 PROCEDURE — 86901 BLOOD TYPING SEROLOGIC RH(D): CPT

## 2018-11-02 PROCEDURE — 77030018846 HC SOL IRR STRL H20 ICUM -A: Performed by: OBSTETRICS & GYNECOLOGY

## 2018-11-02 PROCEDURE — 76210000016 HC OR PH I REC 1 TO 1.5 HR: Performed by: OBSTETRICS & GYNECOLOGY

## 2018-11-02 PROCEDURE — 76010000876 HC OR TIME 2 TO 2.5HR INTENSV - TIER 2: Performed by: OBSTETRICS & GYNECOLOGY

## 2018-11-02 PROCEDURE — 77030014650 HC SEAL MTRX FLOSEL BAXT -C: Performed by: OBSTETRICS & GYNECOLOGY

## 2018-11-02 PROCEDURE — 77030037088 HC TUBE ENDOTRACH ORAL NSL COVD-A: Performed by: ANESTHESIOLOGY

## 2018-11-02 PROCEDURE — 76060000035 HC ANESTHESIA 2 TO 2.5 HR: Performed by: OBSTETRICS & GYNECOLOGY

## 2018-11-02 PROCEDURE — 77030019940 HC BLNKT HYPOTHRM STRY -B: Performed by: NURSE ANESTHETIST, CERTIFIED REGISTERED

## 2018-11-02 PROCEDURE — 74011250636 HC RX REV CODE- 250/636: Performed by: OBSTETRICS & GYNECOLOGY

## 2018-11-02 PROCEDURE — 74011000250 HC RX REV CODE- 250

## 2018-11-02 PROCEDURE — 77030034696 HC CATH URETH FOL 2W BARD -A: Performed by: OBSTETRICS & GYNECOLOGY

## 2018-11-02 PROCEDURE — S2900 ROBOTIC SURGICAL SYSTEM: HCPCS | Performed by: OBSTETRICS & GYNECOLOGY

## 2018-11-02 PROCEDURE — 58571 TLH W/T/O 250 G OR LESS: CPT | Performed by: OBSTETRICS & GYNECOLOGY

## 2018-11-02 PROCEDURE — 77030034744 HC WRMR SCOPE DISP STRL ADLR -A: Performed by: OBSTETRICS & GYNECOLOGY

## 2018-11-02 PROCEDURE — 77030010517 HC APPL SEAL FLOSEL BAXT -B: Performed by: OBSTETRICS & GYNECOLOGY

## 2018-11-02 PROCEDURE — 77030039425 HC BLD LARYNG TRULITE DISP TELE -A: Performed by: ANESTHESIOLOGY

## 2018-11-02 PROCEDURE — 77030002933 HC SUT MCRYL J&J -A: Performed by: OBSTETRICS & GYNECOLOGY

## 2018-11-02 PROCEDURE — 77030020255 HC SOL INJ LR 1000ML BG: Performed by: OBSTETRICS & GYNECOLOGY

## 2018-11-02 PROCEDURE — 77030010545: Performed by: OBSTETRICS & GYNECOLOGY

## 2018-11-02 RX ORDER — ACETAMINOPHEN 10 MG/ML
INJECTION, SOLUTION INTRAVENOUS AS NEEDED
Status: DISCONTINUED | OUTPATIENT
Start: 2018-11-02 | End: 2018-11-02 | Stop reason: HOSPADM

## 2018-11-02 RX ORDER — SODIUM CHLORIDE 0.9 % (FLUSH) 0.9 %
5-10 SYRINGE (ML) INJECTION AS NEEDED
Status: DISCONTINUED | OUTPATIENT
Start: 2018-11-02 | End: 2018-11-03 | Stop reason: HOSPADM

## 2018-11-02 RX ORDER — ONDANSETRON 2 MG/ML
INJECTION INTRAMUSCULAR; INTRAVENOUS AS NEEDED
Status: DISCONTINUED | OUTPATIENT
Start: 2018-11-02 | End: 2018-11-02 | Stop reason: HOSPADM

## 2018-11-02 RX ORDER — FENTANYL CITRATE 50 UG/ML
INJECTION, SOLUTION INTRAMUSCULAR; INTRAVENOUS AS NEEDED
Status: DISCONTINUED | OUTPATIENT
Start: 2018-11-02 | End: 2018-11-02 | Stop reason: HOSPADM

## 2018-11-02 RX ORDER — SCOLOPAMINE TRANSDERMAL SYSTEM 1 MG/1
1 PATCH, EXTENDED RELEASE TRANSDERMAL
Status: DISCONTINUED | OUTPATIENT
Start: 2018-11-02 | End: 2018-11-02

## 2018-11-02 RX ORDER — BUPROPION HYDROCHLORIDE 150 MG/1
150 TABLET, EXTENDED RELEASE ORAL
Status: DISCONTINUED | OUTPATIENT
Start: 2018-11-02 | End: 2018-11-03 | Stop reason: HOSPADM

## 2018-11-02 RX ORDER — KETOROLAC TROMETHAMINE 30 MG/ML
30 INJECTION, SOLUTION INTRAMUSCULAR; INTRAVENOUS AS NEEDED
Status: DISCONTINUED | OUTPATIENT
Start: 2018-11-02 | End: 2018-11-02 | Stop reason: HOSPADM

## 2018-11-02 RX ORDER — SODIUM CHLORIDE, SODIUM LACTATE, POTASSIUM CHLORIDE, CALCIUM CHLORIDE 600; 310; 30; 20 MG/100ML; MG/100ML; MG/100ML; MG/100ML
125 INJECTION, SOLUTION INTRAVENOUS CONTINUOUS
Status: DISCONTINUED | OUTPATIENT
Start: 2018-11-02 | End: 2018-11-02 | Stop reason: HOSPADM

## 2018-11-02 RX ORDER — SODIUM CHLORIDE, SODIUM LACTATE, POTASSIUM CHLORIDE, CALCIUM CHLORIDE 600; 310; 30; 20 MG/100ML; MG/100ML; MG/100ML; MG/100ML
150 INJECTION, SOLUTION INTRAVENOUS CONTINUOUS
Status: DISCONTINUED | OUTPATIENT
Start: 2018-11-02 | End: 2018-11-03 | Stop reason: HOSPADM

## 2018-11-02 RX ORDER — IBUPROFEN 600 MG/1
600 TABLET ORAL
Status: DISCONTINUED | OUTPATIENT
Start: 2018-11-02 | End: 2018-11-03 | Stop reason: HOSPADM

## 2018-11-02 RX ORDER — BUPIVACAINE HYDROCHLORIDE 7.5 MG/ML
INJECTION, SOLUTION INTRASPINAL AS NEEDED
Status: DISCONTINUED | OUTPATIENT
Start: 2018-11-02 | End: 2018-11-02 | Stop reason: HOSPADM

## 2018-11-02 RX ORDER — SODIUM CHLORIDE 0.9 % (FLUSH) 0.9 %
5-10 SYRINGE (ML) INJECTION AS NEEDED
Status: DISCONTINUED | OUTPATIENT
Start: 2018-11-02 | End: 2018-11-02 | Stop reason: HOSPADM

## 2018-11-02 RX ORDER — SERTRALINE HYDROCHLORIDE 100 MG/1
100 TABLET, FILM COATED ORAL
Status: DISCONTINUED | OUTPATIENT
Start: 2018-11-02 | End: 2018-11-03 | Stop reason: HOSPADM

## 2018-11-02 RX ORDER — SODIUM CHLORIDE 0.9 % (FLUSH) 0.9 %
5-10 SYRINGE (ML) INJECTION EVERY 8 HOURS
Status: DISCONTINUED | OUTPATIENT
Start: 2018-11-02 | End: 2018-11-02 | Stop reason: HOSPADM

## 2018-11-02 RX ORDER — SODIUM CHLORIDE 0.9 % (FLUSH) 0.9 %
5-10 SYRINGE (ML) INJECTION EVERY 8 HOURS
Status: DISCONTINUED | OUTPATIENT
Start: 2018-11-02 | End: 2018-11-03 | Stop reason: HOSPADM

## 2018-11-02 RX ORDER — ROCURONIUM BROMIDE 10 MG/ML
INJECTION, SOLUTION INTRAVENOUS AS NEEDED
Status: DISCONTINUED | OUTPATIENT
Start: 2018-11-02 | End: 2018-11-02 | Stop reason: HOSPADM

## 2018-11-02 RX ORDER — OXYCODONE HYDROCHLORIDE 5 MG/1
10 TABLET ORAL
Status: DISCONTINUED | OUTPATIENT
Start: 2018-11-02 | End: 2018-11-02 | Stop reason: HOSPADM

## 2018-11-02 RX ORDER — LIDOCAINE HYDROCHLORIDE 10 MG/ML
0.1 INJECTION INFILTRATION; PERINEURAL AS NEEDED
Status: DISCONTINUED | OUTPATIENT
Start: 2018-11-02 | End: 2018-11-02 | Stop reason: HOSPADM

## 2018-11-02 RX ORDER — GABAPENTIN 300 MG/1
600 CAPSULE ORAL ONCE
Status: COMPLETED | OUTPATIENT
Start: 2018-11-02 | End: 2018-11-02

## 2018-11-02 RX ORDER — DEXAMETHASONE SODIUM PHOSPHATE 4 MG/ML
INJECTION, SOLUTION INTRA-ARTICULAR; INTRALESIONAL; INTRAMUSCULAR; INTRAVENOUS; SOFT TISSUE AS NEEDED
Status: DISCONTINUED | OUTPATIENT
Start: 2018-11-02 | End: 2018-11-02 | Stop reason: HOSPADM

## 2018-11-02 RX ORDER — EPHEDRINE SULFATE 50 MG/ML
INJECTION, SOLUTION INTRAVENOUS AS NEEDED
Status: DISCONTINUED | OUTPATIENT
Start: 2018-11-02 | End: 2018-11-02 | Stop reason: HOSPADM

## 2018-11-02 RX ORDER — MIDAZOLAM HYDROCHLORIDE 1 MG/ML
2 INJECTION, SOLUTION INTRAMUSCULAR; INTRAVENOUS
Status: COMPLETED | OUTPATIENT
Start: 2018-11-02 | End: 2018-11-02

## 2018-11-02 RX ORDER — FAMOTIDINE 20 MG/1
20 TABLET, FILM COATED ORAL 2 TIMES DAILY
Status: DISCONTINUED | OUTPATIENT
Start: 2018-11-02 | End: 2018-11-03 | Stop reason: HOSPADM

## 2018-11-02 RX ORDER — LIDOCAINE HYDROCHLORIDE 20 MG/ML
INJECTION, SOLUTION EPIDURAL; INFILTRATION; INTRACAUDAL; PERINEURAL AS NEEDED
Status: DISCONTINUED | OUTPATIENT
Start: 2018-11-02 | End: 2018-11-02 | Stop reason: HOSPADM

## 2018-11-02 RX ORDER — DIPHENHYDRAMINE HCL 25 MG
25 CAPSULE ORAL
Status: DISCONTINUED | OUTPATIENT
Start: 2018-11-02 | End: 2018-11-03 | Stop reason: HOSPADM

## 2018-11-02 RX ORDER — NALOXONE HYDROCHLORIDE 0.4 MG/ML
0.1 INJECTION, SOLUTION INTRAMUSCULAR; INTRAVENOUS; SUBCUTANEOUS AS NEEDED
Status: DISCONTINUED | OUTPATIENT
Start: 2018-11-02 | End: 2018-11-02 | Stop reason: HOSPADM

## 2018-11-02 RX ORDER — DIVALPROEX SODIUM 125 MG/1
125 TABLET, DELAYED RELEASE ORAL 2 TIMES DAILY
Status: DISCONTINUED | OUTPATIENT
Start: 2018-11-02 | End: 2018-11-03 | Stop reason: HOSPADM

## 2018-11-02 RX ORDER — ONDANSETRON 8 MG/1
8 TABLET, ORALLY DISINTEGRATING ORAL
Status: DISCONTINUED | OUTPATIENT
Start: 2018-11-02 | End: 2018-11-03 | Stop reason: HOSPADM

## 2018-11-02 RX ORDER — CEFAZOLIN SODIUM/WATER 2 G/20 ML
2 SYRINGE (ML) INTRAVENOUS ONCE
Status: COMPLETED | OUTPATIENT
Start: 2018-11-02 | End: 2018-11-02

## 2018-11-02 RX ORDER — MORPHINE SULFATE 10 MG/ML
10 INJECTION, SOLUTION INTRAMUSCULAR; INTRAVENOUS
Status: DISCONTINUED | OUTPATIENT
Start: 2018-11-02 | End: 2018-11-03 | Stop reason: HOSPADM

## 2018-11-02 RX ORDER — OXYCODONE HYDROCHLORIDE 5 MG/1
5-10 TABLET ORAL
Status: DISCONTINUED | OUTPATIENT
Start: 2018-11-02 | End: 2018-11-03 | Stop reason: HOSPADM

## 2018-11-02 RX ORDER — HYDROMORPHONE HYDROCHLORIDE 2 MG/ML
0.5 INJECTION, SOLUTION INTRAMUSCULAR; INTRAVENOUS; SUBCUTANEOUS
Status: DISCONTINUED | OUTPATIENT
Start: 2018-11-02 | End: 2018-11-02 | Stop reason: HOSPADM

## 2018-11-02 RX ORDER — KETOROLAC TROMETHAMINE 30 MG/ML
INJECTION, SOLUTION INTRAMUSCULAR; INTRAVENOUS AS NEEDED
Status: DISCONTINUED | OUTPATIENT
Start: 2018-11-02 | End: 2018-11-02 | Stop reason: HOSPADM

## 2018-11-02 RX ORDER — PROPOFOL 10 MG/ML
INJECTION, EMULSION INTRAVENOUS AS NEEDED
Status: DISCONTINUED | OUTPATIENT
Start: 2018-11-02 | End: 2018-11-02 | Stop reason: HOSPADM

## 2018-11-02 RX ADMIN — ROCURONIUM BROMIDE 50 MG: 10 INJECTION, SOLUTION INTRAVENOUS at 08:30

## 2018-11-02 RX ADMIN — KETOROLAC TROMETHAMINE 30 MG: 30 INJECTION, SOLUTION INTRAMUSCULAR; INTRAVENOUS at 10:15

## 2018-11-02 RX ADMIN — DIVALPROEX SODIUM 125 MG: 125 TABLET, DELAYED RELEASE ORAL at 17:22

## 2018-11-02 RX ADMIN — PROPOFOL 200 MG: 10 INJECTION, EMULSION INTRAVENOUS at 08:30

## 2018-11-02 RX ADMIN — FENTANYL CITRATE 50 MCG: 50 INJECTION, SOLUTION INTRAMUSCULAR; INTRAVENOUS at 09:30

## 2018-11-02 RX ADMIN — SODIUM CHLORIDE, SODIUM LACTATE, POTASSIUM CHLORIDE, AND CALCIUM CHLORIDE 150 ML/HR: 600; 310; 30; 20 INJECTION, SOLUTION INTRAVENOUS at 14:44

## 2018-11-02 RX ADMIN — ACETAMINOPHEN 1000 MG: 10 INJECTION, SOLUTION INTRAVENOUS at 10:15

## 2018-11-02 RX ADMIN — GABAPENTIN 600 MG: 300 CAPSULE ORAL at 07:33

## 2018-11-02 RX ADMIN — OXYCODONE HYDROCHLORIDE 5 MG: 5 TABLET ORAL at 17:25

## 2018-11-02 RX ADMIN — SODIUM CHLORIDE, SODIUM LACTATE, POTASSIUM CHLORIDE, AND CALCIUM CHLORIDE: 600; 310; 30; 20 INJECTION, SOLUTION INTRAVENOUS at 08:24

## 2018-11-02 RX ADMIN — ONDANSETRON 4 MG: 2 INJECTION INTRAMUSCULAR; INTRAVENOUS at 08:30

## 2018-11-02 RX ADMIN — FENTANYL CITRATE 50 MCG: 50 INJECTION, SOLUTION INTRAMUSCULAR; INTRAVENOUS at 09:02

## 2018-11-02 RX ADMIN — ROCURONIUM BROMIDE 10 MG: 10 INJECTION, SOLUTION INTRAVENOUS at 09:00

## 2018-11-02 RX ADMIN — SODIUM CHLORIDE, SODIUM LACTATE, POTASSIUM CHLORIDE, AND CALCIUM CHLORIDE: 600; 310; 30; 20 INJECTION, SOLUTION INTRAVENOUS at 10:15

## 2018-11-02 RX ADMIN — LIDOCAINE HYDROCHLORIDE 0.1 ML: 10 INJECTION, SOLUTION INFILTRATION; PERINEURAL at 07:49

## 2018-11-02 RX ADMIN — EPHEDRINE SULFATE 10 MG: 50 INJECTION, SOLUTION INTRAVENOUS at 08:45

## 2018-11-02 RX ADMIN — FAMOTIDINE 20 MG: 20 TABLET ORAL at 17:22

## 2018-11-02 RX ADMIN — SERTRALINE HYDROCHLORIDE 100 MG: 100 TABLET ORAL at 22:12

## 2018-11-02 RX ADMIN — EPHEDRINE SULFATE 10 MG: 50 INJECTION, SOLUTION INTRAVENOUS at 08:50

## 2018-11-02 RX ADMIN — LIDOCAINE HYDROCHLORIDE 100 MG: 20 INJECTION, SOLUTION EPIDURAL; INFILTRATION; INTRACAUDAL; PERINEURAL at 08:30

## 2018-11-02 RX ADMIN — SCOPALAMINE 1 PATCH: 1 PATCH, EXTENDED RELEASE TRANSDERMAL at 08:48

## 2018-11-02 RX ADMIN — MIDAZOLAM 2 MG: 1 INJECTION INTRAMUSCULAR; INTRAVENOUS at 08:13

## 2018-11-02 RX ADMIN — OXYCODONE HYDROCHLORIDE 5 MG: 5 TABLET ORAL at 19:05

## 2018-11-02 RX ADMIN — BUPROPION HYDROCHLORIDE 150 MG: 150 TABLET, FILM COATED, EXTENDED RELEASE ORAL at 22:12

## 2018-11-02 RX ADMIN — IBUPROFEN 600 MG: 600 TABLET ORAL at 22:15

## 2018-11-02 RX ADMIN — FENTANYL CITRATE 100 MCG: 50 INJECTION, SOLUTION INTRAMUSCULAR; INTRAVENOUS at 08:30

## 2018-11-02 RX ADMIN — Medication 2 G: at 08:28

## 2018-11-02 RX ADMIN — SODIUM CHLORIDE, SODIUM LACTATE, POTASSIUM CHLORIDE, AND CALCIUM CHLORIDE 150 ML/HR: 600; 310; 30; 20 INJECTION, SOLUTION INTRAVENOUS at 20:30

## 2018-11-02 RX ADMIN — SODIUM CHLORIDE, SODIUM LACTATE, POTASSIUM CHLORIDE, AND CALCIUM CHLORIDE 125 ML/HR: 600; 310; 30; 20 INJECTION, SOLUTION INTRAVENOUS at 07:49

## 2018-11-02 RX ADMIN — DEXAMETHASONE SODIUM PHOSPHATE 10 MG: 4 INJECTION, SOLUTION INTRA-ARTICULAR; INTRALESIONAL; INTRAMUSCULAR; INTRAVENOUS; SOFT TISSUE at 08:30

## 2018-11-02 NOTE — OP NOTES
Mallory Thomson    1980        Preop Dx:      1. Pelvic pain  2. Endometriosis/adenomyosis      Postop Dx:  Same      Procedure:  Robotic-assisted total vaginal hysterectomy with bilateral sapingectomies      Surgeon:  Grant Girard        Anesthesia:  GETA      EBL:  25 mL      IVF:  2000 mL     UOP:  480 mL      Complications:  None      Findings: Boggy uterine fundus consistent with known adenomyosis. Nml adnexa bilaterally      Procedure:  Pt was taken to operating room where general endotracheal anesthesia was found to be adequate. She was prepped and draped in the usual fashion, placed in the dorsal lithotomy position in the [employer]. Gibbs placed transurethrally without difficulty. A bivalved speculum was placed into vagina and anterior lip of the cervix was grasped with single tooth tenaculum. The cervix was then sequentially dilated with dilators and a large V-Care was placed through the cervix and cervical cap secured into place with a 0 Vicryl suture. Speculum and tenaculum were removed. Attention was then turned to the abdomen. An 8 mm skin incision was made with the scalpel below the umbilicus and veress needle introduced through the incision. Intraabdominal placement confirmed with drop in pressure. Pneumopretioneum was then obtained with insufflation of CO2 gas. Bladeless 5 mm trocar was placed intraabdominally under direct visualization with the laparoscope. Survey of the patient's pelvis and abdomen revealed the above findings. A second and third 8 mm robotic trocars were placed in the right and left lower quadrants all under direct visualization with the laparoscope. Camera was moved to the LLQ port. Infraumbilical trocar was replaced with 8 mm robotic camera trocar and Airseal bladeless trocar was placed in LUQ both under visualization with the laparoscope. The robot was then docked in the usual fashion and I proceeded to the console.     Bowel was swept out of the way and findings were noted as above. Ureters were noted to be 3-4 cm away from the operative field. The right mesosalpinx was grasped cauterized and sequentially transected to release ovary and allow it to fall away from operative field. The Utero-Ovarian ligament was then grasped on the right, cauterized and transected. This was done in a sequential fashion to the round ligament, the medial leaf of the broad ligament towards the right uterine artery. Bladder flap was developed with sharp and blunt dissection and uterine artery dissected. Once the artery was isolated, it was grasped with the PK, cauterized in 2 second bursts and transected. Allowed to fall away from the operative field. Attention was then turned to the left side where in a similar fashion, the left mesosalpinx was grasped cauterized and sequentially transected to release ovary and allow it to fall away from operative field. The Utero-Ovarian ligament was then grasped on the left, cauterized and transected. This was done in a sequential fashion to the left round ligament, the medial leaf of the broad ligament towards the left uterine artery. Bladder flap was developed with sharp and blunt dissection and uterine artery dissected. Once the artery was isolated, it was grasped with the PK, cauterized in 2 second bursts and transected. Allowed to fall away from the operative field. The cervical cap could be seen and tissue continued to be developed until the cap could be seen circumferentially. Once done, a posterior colpotomy was made and followed circumferentially around until the cervix and uterus were detatched from the pelvis. Uterus, cervix and fallopian tubes were removed through the vagina. The vaginal cuff was the closed using a V-Lock suture, starting fromthe right and  working across the cuff to the left aspect then working back several throws to secure it into place.   The pelvic was copiously irrigated and vaginal cuff and all pedicles noted to be hemostatic. Tisseal was then sprayed along vaginal cuff and pedicles to assure hemostasis. All instruments and CO2 were removed from abdominal cavity and incision sites closed using 4-0 monocryl. Sponge, lap and needle counts correct x 3.       Disposition:  Pt tolerated procedure well and sent to [facility] in stable condition      Pathology:  Uterus, cervix and fallopian tubes      Dominick Wallace MD   10:32 AM  11/02/18

## 2018-11-02 NOTE — PROGRESS NOTES
TRANSFER - IN REPORT: 
 
Verbal report received from Robi(name) on [place]  being received from [facility]) for routine post - op Report consisted of patients Situation, Background, Assessment and  
Recommendations(SBAR). Information from the following report(s) SBAR, Kardex and OR Summary was reviewed with the receiving nurse. Opportunity for questions and clarification was provided. Assessment completed upon patients arrival to unit and care assumed.

## 2018-11-02 NOTE — PROGRESS NOTES
Pt arrived to room 367. Pt eyes open to voice but pt is still drowsy. No signs of acute distress. Pt states she is comfortable. IVF infusing. Gibbs in place and draining. Call light within reach. Pt instructed to call for assistance.

## 2018-11-02 NOTE — PERIOP NOTES
TRANSFER - OUT REPORT: 
 
Verbal report given to Marcia Miles RN on [facility]  being transferred to [id number] for routine post - op Report consisted of patients Situation, Background, Assessment and  
Recommendations(SBAR). Information from the following report(s) OR Summary, Procedure Summary, Intake/Output and MAR was reviewed with the receiving nurse. Opportunity for questions and clarification was provided. Patient transported with: 
 O2 @ 1 liters

## 2018-11-02 NOTE — INTERVAL H&P NOTE
I have examined and spoken with the patient this morning. She has no new medical issues or complaints. She reports no new medicines since H&P. She again understands procedure and agrees to proceed. Deisi Rubalcava MD 
8:11 AM 
11/02/18

## 2018-11-02 NOTE — ANESTHESIA POSTPROCEDURE EVALUATION
Procedure(s): HYSTERECTOMY ROBOTIC ASSISTED WITH BILATERAL SALPINGECTOMY. Anesthesia Post Evaluation Patient location during evaluation: PACU Patient participation: complete - patient participated Level of consciousness: responsive to verbal stimuli Pain management: adequate Airway patency: patent Anesthetic complications: no 
Cardiovascular status: acceptable Respiratory status: acceptable Hydration status: euvolemic Visit Vitals /78 Pulse 91 Temp 36.7 °C (98.1 °F) Resp 16 Wt 91.2 kg (201 lb 2 oz) SpO2 98% Breastfeeding? No  
BMI 28.05 kg/m²

## 2018-11-03 VITALS
WEIGHT: 201.13 LBS | TEMPERATURE: 96.5 F | RESPIRATION RATE: 18 BRPM | HEART RATE: 74 BPM | SYSTOLIC BLOOD PRESSURE: 126 MMHG | BODY MASS INDEX: 28.05 KG/M2 | OXYGEN SATURATION: 98 % | DIASTOLIC BLOOD PRESSURE: 78 MMHG

## 2018-11-03 LAB
ERYTHROCYTE [DISTWIDTH] IN BLOOD BY AUTOMATED COUNT: 13.9 %
HCT VFR BLD AUTO: 33.4 % (ref 35.8–46.3)
HGB BLD-MCNC: 10.6 G/DL (ref 11.7–15.4)
MCH RBC QN AUTO: 28.1 PG (ref 26.1–32.9)
MCHC RBC AUTO-ENTMCNC: 31.7 G/DL (ref 31.4–35)
MCV RBC AUTO: 88.6 FL (ref 79.6–97.8)
NRBC # BLD: 0 K/UL (ref 0–0.2)
PLATELET # BLD AUTO: 203 K/UL (ref 150–450)
PMV BLD AUTO: 10.6 FL (ref 9.4–12.3)
RBC # BLD AUTO: 3.77 M/UL (ref 4.05–5.2)
WBC # BLD AUTO: 11.2 K/UL (ref 4.3–11.1)

## 2018-11-03 PROCEDURE — 85027 COMPLETE CBC AUTOMATED: CPT

## 2018-11-03 PROCEDURE — 99218 HC RM OBSERVATION: CPT

## 2018-11-03 PROCEDURE — 36415 COLL VENOUS BLD VENIPUNCTURE: CPT

## 2018-11-03 PROCEDURE — 74011250636 HC RX REV CODE- 250/636: Performed by: OBSTETRICS & GYNECOLOGY

## 2018-11-03 PROCEDURE — 74011250637 HC RX REV CODE- 250/637: Performed by: OBSTETRICS & GYNECOLOGY

## 2018-11-03 RX ORDER — IBUPROFEN 600 MG/1
600 TABLET ORAL
Qty: 60 TAB | Refills: 1 | Status: SHIPPED | OUTPATIENT
Start: 2018-11-03 | End: 2020-10-12

## 2018-11-03 RX ORDER — OXYCODONE AND ACETAMINOPHEN 7.5; 325 MG/1; MG/1
1 TABLET ORAL
Qty: 24 TAB | Refills: 0 | Status: SHIPPED | OUTPATIENT
Start: 2018-11-03 | End: 2018-12-11

## 2018-11-03 RX ADMIN — IBUPROFEN 600 MG: 600 TABLET ORAL at 08:35

## 2018-11-03 RX ADMIN — SODIUM CHLORIDE, SODIUM LACTATE, POTASSIUM CHLORIDE, AND CALCIUM CHLORIDE 150 ML/HR: 600; 310; 30; 20 INJECTION, SOLUTION INTRAVENOUS at 04:00

## 2018-11-03 RX ADMIN — FAMOTIDINE 20 MG: 20 TABLET ORAL at 08:35

## 2018-11-03 RX ADMIN — DIVALPROEX SODIUM 125 MG: 125 TABLET, DELAYED RELEASE ORAL at 08:35

## 2018-11-03 RX ADMIN — OXYCODONE HYDROCHLORIDE 10 MG: 5 TABLET ORAL at 08:33

## 2018-11-03 NOTE — PROGRESS NOTES
Pt is S/P RAVH-BS for adeonmyosis/pelvic pain . No complaints today. Normal PO pain. No GI/ issues. No F/C, CP, SOB Visit Vitals /78 (BP 1 Location: Left arm, BP Patient Position: At rest;Head of bed elevated (Comment degrees)) Pulse 74 Temp 96.5 °F (35.8 °C) Resp 18 Wt 201 lb 2 oz (91.2 kg) SpO2 98% Breastfeeding? No  
BMI 28.05 kg/m² CV - RRR 
LUNGS - CTA bilaterally ABD - soft, appropriate tenderness, incisions C/D/I 
EXT - tr edema bilaterally Labs:   
Recent Results (from the past 24 hour(s)) CBC W/O DIFF Collection Time: 11/03/18  4:58 AM  
Result Value Ref Range WBC 11.2 (H) 4.3 - 11.1 K/uL  
 RBC 3.77 (L) 4.05 - 5.2 M/uL  
 HGB 10.6 (L) 11.7 - 15.4 g/dL HCT 33.4 (L) 35.8 - 46.3 % MCV 88.6 79.6 - 97.8 FL  
 MCH 28.1 26.1 - 32.9 PG  
 MCHC 31.7 31.4 - 35.0 g/dL  
 RDW 13.9 % PLATELET 330 007 - 426 K/uL MPV 10.6 9.4 - 12.3 FL ABSOLUTE NRBC 0.00 0.0 - 0.2 K/uL POD #1 RAVH-BS Stable. Routine PO instructions Tavo De La Vega MD 
10:28 AM 
11/03/18

## 2018-11-03 NOTE — DISCHARGE INSTRUCTIONS
Keep incisions clean and dry - use a hair dryer on cool setting. Please let us know if you develop fever over 101.0, severe abdominal pain, persistent nausea or vomitting, or your incision gets red, hot, swollen or has a foul discharge. Continue pelvic rest until your next visit - nothing in the vagina -- no sex, tampons, douche, etc.  No baths - showers only  Thanks for letting me take care of you!

## 2018-11-03 NOTE — PROGRESS NOTES
Pt alert and oriented x 4. Lungs clear breathing non-labored. Bowel sounds + q 4 passing flatus. Ambulating in room and hallway independently. Gibbs removed early this am pt has not voided yet. Multiple puncture sites c/d/i. Oxycodone 10 mg given po for 5/10 abd pain. Ibuprofen given for antiinflammatory aspect. Verbalizes needs well. Continue to monitor.

## 2018-11-03 NOTE — PROGRESS NOTES
Discharge instructions reviewed with patient. Opportunity for questions given. Pt instructed to call on Monday for follow up appt, pt voiced understanding. Rx given for Percocet.

## 2018-11-03 NOTE — PROGRESS NOTES
Chart screened by  for discharge planning. No needs identified at this time. Please consult  if any new issues arise. CLAYTON Mckinnon  [email or web address]

## 2018-11-15 PROBLEM — Z09 POSTOP CHECK: Status: ACTIVE | Noted: 2018-11-15

## 2018-11-15 PROBLEM — Z01.419 WOMEN'S ANNUAL ROUTINE GYNECOLOGICAL EXAMINATION: Status: RESOLVED | Noted: 2018-06-21 | Resolved: 2018-11-15

## 2018-12-11 PROBLEM — N80.9 ENDOMETRIOSIS DETERMINED BY LAPAROSCOPY: Status: RESOLVED | Noted: 2018-10-04 | Resolved: 2018-12-11

## 2018-12-11 PROBLEM — R10.2 PELVIC PAIN: Status: RESOLVED | Noted: 2018-11-02 | Resolved: 2018-12-11

## 2018-12-11 PROBLEM — N80.00 UTERUS, ADENOMYOSIS: Status: RESOLVED | Noted: 2018-10-04 | Resolved: 2018-12-11

## 2018-12-11 PROBLEM — N94.6 DYSMENORRHEA: Status: RESOLVED | Noted: 2017-11-03 | Resolved: 2018-12-11

## 2019-11-12 PROBLEM — Z09 POSTOP CHECK: Status: RESOLVED | Noted: 2018-11-15 | Resolved: 2019-11-12

## 2019-11-12 PROBLEM — R10.2 PELVIC PAIN IN FEMALE: Status: RESOLVED | Noted: 2017-11-03 | Resolved: 2019-11-12

## 2019-11-12 PROBLEM — L02.412 ABSCESS OF AXILLA, LEFT: Status: ACTIVE | Noted: 2019-11-12

## 2019-11-12 PROBLEM — Z01.419 WOMEN'S ANNUAL ROUTINE GYNECOLOGICAL EXAMINATION: Status: ACTIVE | Noted: 2019-11-12

## 2019-11-13 ENCOUNTER — HOSPITAL ENCOUNTER (OUTPATIENT)
Dept: LAB | Age: 39
Discharge: HOME | End: 2019-11-13
Attending: SURGERY
Payer: COMMERCIAL

## 2019-11-13 PROCEDURE — 87077 CULTURE AEROBIC IDENTIFY: CPT

## 2019-11-13 PROCEDURE — 87205 SMEAR GRAM STAIN: CPT

## 2019-11-13 PROCEDURE — 87075 CULTR BACTERIA EXCEPT BLOOD: CPT

## 2019-11-13 PROCEDURE — 87186 SC STD MICRODIL/AGAR DIL: CPT

## 2019-11-18 LAB
BACTERIA SPEC CULT: ABNORMAL
GRAM STN SPEC: ABNORMAL
SERVICE CMNT-IMP: ABNORMAL

## 2019-11-22 LAB
BACTERIA SPEC CULT: NORMAL
SERVICE CMNT-IMP: NORMAL

## 2022-02-09 PROBLEM — R10.31 RIGHT LOWER QUADRANT PAIN: Status: ACTIVE | Noted: 2022-02-09

## 2022-03-18 PROBLEM — L02.412 ABSCESS OF AXILLA, LEFT: Status: ACTIVE | Noted: 2019-11-12

## 2022-03-19 PROBLEM — Z01.419 WOMEN'S ANNUAL ROUTINE GYNECOLOGICAL EXAMINATION: Status: ACTIVE | Noted: 2019-11-12

## 2022-03-19 PROBLEM — R10.31 RIGHT LOWER QUADRANT PAIN: Status: ACTIVE | Noted: 2022-02-09

## 2024-12-19 ENCOUNTER — OFFICE VISIT (OUTPATIENT)
Dept: OBGYN CLINIC | Age: 44
End: 2024-12-19
Payer: COMMERCIAL

## 2024-12-19 VITALS
WEIGHT: 223 LBS | DIASTOLIC BLOOD PRESSURE: 80 MMHG | SYSTOLIC BLOOD PRESSURE: 110 MMHG | HEIGHT: 71 IN | BODY MASS INDEX: 31.22 KG/M2

## 2024-12-19 DIAGNOSIS — R10.2 PELVIC PAIN: Primary | ICD-10-CM

## 2024-12-19 DIAGNOSIS — A59.01 TRICHOMONAL VAGINITIS: ICD-10-CM

## 2024-12-19 PROBLEM — R10.31 RIGHT LOWER QUADRANT PAIN: Status: RESOLVED | Noted: 2022-02-09 | Resolved: 2024-12-19

## 2024-12-19 LAB
HBV SURFACE AG SER QL: NONREACTIVE
HCV AB SER QL: NONREACTIVE
HIV 1+2 AB+HIV1 P24 AG SERPL QL IA: NONREACTIVE
HIV 1/2 RESULT COMMENT: NORMAL
T PALLIDUM AB SER QL IA: NONREACTIVE

## 2024-12-19 PROCEDURE — 99459 PELVIC EXAMINATION: CPT | Performed by: OBSTETRICS & GYNECOLOGY

## 2024-12-19 PROCEDURE — 99214 OFFICE O/P EST MOD 30 MIN: CPT | Performed by: OBSTETRICS & GYNECOLOGY

## 2024-12-19 RX ORDER — DIVALPROEX SODIUM 125 MG/1
1 TABLET, DELAYED RELEASE ORAL DAILY
COMMUNITY
Start: 2024-02-28

## 2024-12-19 RX ORDER — VALACYCLOVIR HYDROCHLORIDE 500 MG/1
500 TABLET, FILM COATED ORAL 2 TIMES DAILY
COMMUNITY

## 2024-12-19 NOTE — PROGRESS NOTES
Chaperone for Intimate Exam     Chaperone was offer accepted as part of the rooming process    Chaperone: Juliana Reid  
Patient comes in today for pelvic pain. Patient states she came home from driving 11/19 because the pain was so bad. Patient states the pain got worse. She stated she seen her GP who ordered a CT which showed a cyst on ovary. Patient states she was told she had trichomoniasis and was treated. Patient states she would like to have all std testing including blood work. Patient states her pain subsided around 12/05 or 12/06.     LAST PAP:  s/p hysterectomy    LAST MAMMO:  04/21/2023    LMP:  No LMP recorded. Patient has had a hysterectomy.    BIRTH CONTROL:  status post hysterectomy    TOBACCO USE:  No    FAMILY HISTORY OF:   Breast Cancer:  Yes   Ovarian Cancer:  No   Uterine Cancer:  No   Colon Cancer:  No    Vitals:    12/19/24 1124   BP: 110/80   Site: Left Upper Arm   Position: Sitting   Weight: 101.2 kg (223 lb)   Height: 1.803 m (5' 11\")        Annie Pulliam MA  12/19/24  11:37 AM  
    Hermelindo Chopra MD   12:00 PM  12/19/24

## 2024-12-19 NOTE — ASSESSMENT & PLAN NOTE
All previous notes, labs and/or imaging performed by ED were reviewed and confirmed with pt today.  I interpreted these results and D/W pt my opinion and recommendations accordingly.   D/W pt that in light of findings, acute onset and spont resolution that this more likely than not cyst rupture, hemorrhagic cyst  D/W her that this is usually self limiting but OCPs can help prevent this in the future - she declines at this time

## 2024-12-22 LAB
A VAGINAE DNA VAG QL NAA+PROBE: NORMAL SCORE
BVAB2 DNA VAG QL NAA+PROBE: NORMAL SCORE
C ALBICANS DNA VAG QL NAA+PROBE: NEGATIVE
C GLABRATA DNA VAG QL NAA+PROBE: NEGATIVE
C TRACH RRNA SPEC QL NAA+PROBE: NEGATIVE
MEGA1 DNA VAG QL NAA+PROBE: NORMAL SCORE
N GONORRHOEA RRNA SPEC QL NAA+PROBE: NEGATIVE
SPECIMEN SOURCE: NORMAL
T VAGINALIS RRNA SPEC QL NAA+PROBE: NEGATIVE

## 2025-03-25 ENCOUNTER — OFFICE VISIT (OUTPATIENT)
Dept: OBGYN CLINIC | Age: 45
End: 2025-03-25
Payer: COMMERCIAL

## 2025-03-25 VITALS
SYSTOLIC BLOOD PRESSURE: 128 MMHG | HEIGHT: 71 IN | WEIGHT: 229 LBS | BODY MASS INDEX: 32.06 KG/M2 | DIASTOLIC BLOOD PRESSURE: 80 MMHG

## 2025-03-25 DIAGNOSIS — R23.2 HOT FLASHES: ICD-10-CM

## 2025-03-25 DIAGNOSIS — Z01.419 WOMEN'S ANNUAL ROUTINE GYNECOLOGICAL EXAMINATION: Primary | ICD-10-CM

## 2025-03-25 DIAGNOSIS — Z12.31 SCREENING MAMMOGRAM, ENCOUNTER FOR: ICD-10-CM

## 2025-03-25 DIAGNOSIS — N89.8 VAGINAL DISCHARGE: ICD-10-CM

## 2025-03-25 DIAGNOSIS — N39.3 STRESS INCONTINENCE: ICD-10-CM

## 2025-03-25 PROBLEM — R10.2 PELVIC PAIN: Status: RESOLVED | Noted: 2024-12-19 | Resolved: 2025-03-25

## 2025-03-25 PROBLEM — L02.412 ABSCESS OF AXILLA, LEFT: Status: RESOLVED | Noted: 2019-11-12 | Resolved: 2025-03-25

## 2025-03-25 LAB
ESTRADIOL SERPL-MCNC: 94 PG/ML
FSH SERPL-ACNC: 4.8 MIU/ML

## 2025-03-25 PROCEDURE — 99459 PELVIC EXAMINATION: CPT | Performed by: OBSTETRICS & GYNECOLOGY

## 2025-03-25 PROCEDURE — 99396 PREV VISIT EST AGE 40-64: CPT | Performed by: OBSTETRICS & GYNECOLOGY

## 2025-03-25 PROCEDURE — 99214 OFFICE O/P EST MOD 30 MIN: CPT | Performed by: OBSTETRICS & GYNECOLOGY

## 2025-03-25 NOTE — PATIENT INSTRUCTIONS
Please get your mammogram and do the Kegel exercises like we discussed  We will call you if anything is abnormal from your testing today.   Follow up as needed or next year for your yearly female exam.  Thanks for coming to see us today and letting us take care of you!

## 2025-03-25 NOTE — PROGRESS NOTES
Patient comes in today for annual exam. Patient states she has noticed hot flashes for the last year. She stated her PT told she could be going through menopause or her thyroid.     LAST PAP:  s/p hysterectomy    LAST MAMMO:  04/20/2023    LMP:  No LMP recorded. Patient has had a hysterectomy.    BIRTH CONTROL:  status post hysterectomy    TOBACCO USE:  No    FAMILY HISTORY OF:   Breast Cancer:  Yes   Ovarian Cancer:  No   Uterine Cancer:  No   Colon Cancer:  No    Vitals:    03/25/25 1446   BP: 128/80   BP Site: Left Upper Arm   Patient Position: Sitting   Weight: 103.9 kg (229 lb)   Height: 1.803 m (5' 11\")        Annie Pulliam MA  03/25/25  2:52 PM  
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    PARATHYROIDECTOMY  03/13/2018    SALPINGECTOMY  11/2018    THYROIDECTOMY, PARTIAL Left 03/13/2018    TONSILLECTOMY AND ADENOIDECTOMY      TUBAL LIGATION      WISDOM TOOTH EXTRACTION             Family History   Problem Relation Age of Onset    Diabetes Mother     Hypertension Mother     Breast Cancer Mother     Diabetes Father     Hypertension Father     Other Sister         fibroids    Asthma Maternal Grandmother     Breast Cancer Maternal Grandmother     Colon Cancer Neg Hx     Uterine Cancer Neg Hx     Ovarian Cancer Neg Hx            Social History     Socioeconomic History    Marital status:      Spouse name: Not on file    Number of children: Not on file    Years of education: Not on file    Highest education level: Not on file   Occupational History    Not on file   Tobacco Use    Smoking status: Never    Smokeless tobacco: Never   Substance and Sexual Activity    Alcohol use: Yes     Alcohol/week: 1.0 standard drink of alcohol     Types: 1 Glasses of wine per week     Comment: 2x a month    Drug use: Never    Sexual activity: Yes     Partners: Male     Birth control/protection: Surgical     Comment: hysterectomy   Other Topics Concern    Not on file   Social History Narrative    Abuse: Feels safe at home, no history of physical abuse, no history of sexual abuse       Social Drivers of Health     Financial Resource Strain: Not on File (3/2/2022)    Received from [facility]    Financial Resource Strain     Financial Resource Strain: 0   Food Insecurity: Not on File (9/26/2024)    Received from [facility]    Food Insecurity     Food: 0   Transportation Needs: Not on File (3/2/2022)    Received from [facility]    Transportation Needs     Transportation: 0

## 2025-03-26 ENCOUNTER — RESULTS FOLLOW-UP (OUTPATIENT)
Dept: OBGYN CLINIC | Age: 45
End: 2025-03-26

## 2025-03-27 LAB
A VAGINAE DNA VAG QL NAA+PROBE: ABNORMAL SCORE
BVAB2 DNA VAG QL NAA+PROBE: ABNORMAL SCORE
C ALBICANS DNA VAG QL NAA+PROBE: NEGATIVE
C GLABRATA DNA VAG QL NAA+PROBE: NEGATIVE
C TRACH RRNA SPEC QL NAA+PROBE: NEGATIVE
MEGA1 DNA VAG QL NAA+PROBE: ABNORMAL SCORE
N GONORRHOEA RRNA SPEC QL NAA+PROBE: NEGATIVE
SPECIMEN SOURCE: ABNORMAL
T VAGINALIS RRNA SPEC QL NAA+PROBE: NEGATIVE

## 2025-03-27 RX ORDER — METRONIDAZOLE 7.5 MG/G
1 GEL VAGINAL DAILY
Qty: 70 G | Refills: 0 | Status: SHIPPED | OUTPATIENT
Start: 2025-03-27 | End: 2025-04-01

## 2025-03-27 RX ORDER — METRONIDAZOLE 7.5 MG/G
1 GEL VAGINAL DAILY
Qty: 70 G | Refills: 0 | Status: CANCELLED | OUTPATIENT
Start: 2025-03-27 | End: 2025-04-01

## 2025-03-27 NOTE — TELEPHONE ENCOUNTER
My Note Signed 1:23 PM     Addend     Delete     Copy     Patient informed of results and recommendations. Rx pend. Patient voiced understanding.            My Note Signed 1:21 PM     Addend     Delete     Copy     ----- Message from Dr. Hermelindo Chopra MD sent at 3/27/2025  1:08 PM EDT -----  Please let pt know that her swab showed (+) BV.  We can send in Flagyl 500 mg PO BID x 7 days or metrogel nightly x 5 days for her for this

## 2025-03-27 NOTE — TELEPHONE ENCOUNTER
----- Message from Dr. Hermelindo Chopra MD sent at 3/27/2025  1:08 PM EDT -----  Please let pt know that her swab showed (+) BV.  We can send in Flagyl 500 mg PO BID x 7 days or metrogel nightly x 5 days for her for this

## (undated) DEVICE — KENDALL SCD EXPRESS SLEEVES, KNEE LENGTH, MEDIUM: Brand: KENDALL SCD

## (undated) DEVICE — 40585 XL ADVANCED TRENDELENBURG POSITIONING KIT: Brand: 40585 XL ADVANCED TRENDELENBURG POSITIONING KIT

## (undated) DEVICE — BLADELESS OPTICAL TROCAR WITH FIXATION CANNULA: Brand: VERSAPORT

## (undated) DEVICE — CONTAINER SPEC HISTOLOGY 900ML POLYPR

## (undated) DEVICE — APPLICATOR FBR TIP L6IN COT TIP WOOD SHFT SWAB 2000 PER CA

## (undated) DEVICE — VISUALIZATION SYSTEM: Brand: CLEARIFY

## (undated) DEVICE — SYR 10ML LUER LOK 1/5ML GRAD --

## (undated) DEVICE — FLOSEAL HEMOSTATIC MATRIX, 5 ML: Brand: FLOSEAL

## (undated) DEVICE — REM POLYHESIVE ADULT PATIENT RETURN ELECTRODE: Brand: VALLEYLAB

## (undated) DEVICE — MONOPOLAR CURVED SCISSORS: Brand: ENDOWRIST

## (undated) DEVICE — PERI-PAD,MODERATE: Brand: CURITY

## (undated) DEVICE — DRAPE TWL SURG 16X26IN BLU ORB04] ALLCARE INC]

## (undated) DEVICE — DRAPE,UNDERBUTTOCKS,PCH,STERILE: Brand: MEDLINE

## (undated) DEVICE — UNIVERSAL FIXATION CANNULA: Brand: VERSAONE

## (undated) DEVICE — ELECTRO LUBE IS A SINGLE PATIENT USE DEVICE THAT IS INTENDED TO BE USED ON ELECTROSURGICAL ELECTRODES TO REDUCE STICKING.: Brand: KEY SURGICAL ELECTRO LUBE

## (undated) DEVICE — BLADELESS OPTICAL TROCAR WITH FIXATION CANNULA: Brand: VERSAONE

## (undated) DEVICE — CANNULA SEAL

## (undated) DEVICE — Device

## (undated) DEVICE — 2000CC GUARDIAN II: Brand: GUARDIAN

## (undated) DEVICE — CONTROL SYRINGE LUER-LOCK TIP: Brand: MONOJECT

## (undated) DEVICE — TRI-LUMEN FILTERED TUBE SET WITH ACTIVATED CHARCOAL FILTER: Brand: AIRSEAL

## (undated) DEVICE — NEEDLE HYPO 21GA L1.5IN INTRAMUSCULAR S STL LATCH BVL UP

## (undated) DEVICE — NEEDLE SPNL 22GA L3.5IN BLK HUB S STL REG WALL FIT STYL W/

## (undated) DEVICE — JELLY LUBRICATING 10GM PREFIL SYR LUBE

## (undated) DEVICE — FILTER SMK EVAC FLO CLR MEGADYNE

## (undated) DEVICE — SPONGE LAP 18X18IN STRL -- 5/PK

## (undated) DEVICE — VCARE LARGE, UTERINE MANIPULATOR, VAGINAL-CERVICAL-AHLUWALIA'S-RETRACTOR-ELEVATOR: Brand: VCARE

## (undated) DEVICE — COBRA GRASPER: Brand: ENDOWRIST;DAVINCI SI

## (undated) DEVICE — BUTTON SWITCH PENCIL BLADE ELECTRODE, HOLSTER: Brand: EDGE

## (undated) DEVICE — Z DUPLICATE USE 2847003 INJECTOR UTER

## (undated) DEVICE — BAG DRNGE 4000ML CONT IRRIG ROUNDED TEARDROP SHP DISP

## (undated) DEVICE — WARMER SCP STRL DISP

## (undated) DEVICE — OBTRTR BLDELSS 8MM DISP -- DA VINCI - SNGL USE

## (undated) DEVICE — CARDINAL HEALTH FLEXIBLE LIGHT HANDLE COVER: Brand: CARDINAL HEALTH

## (undated) DEVICE — SYR LR LCK 1ML GRAD NSAF 30ML --

## (undated) DEVICE — DRAPE,TOP,102X53,STERILE: Brand: MEDLINE

## (undated) DEVICE — LAP CHOLE: Brand: MEDLINE INDUSTRIES, INC.

## (undated) DEVICE — (D)STRIP SKN CLSR 0.5X4IN WHT --

## (undated) DEVICE — [HIGH FLOW INSUFFLATOR,  DO NOT USE IF PACKAGE IS DAMAGED,  KEEP DRY,  KEEP AWAY FROM SUNLIGHT,  PROTECT FROM HEAT AND RADIOACTIVE SOURCES.]: Brand: PNEUMOSURE

## (undated) DEVICE — MASTISOL ADHESIVE LIQ 2/3ML

## (undated) DEVICE — INSUFFLATION NEEDLE: Brand: SURGINEEDLE

## (undated) DEVICE — MEGA NEEDLE DRIVER: Brand: ENDOWRIST;DAVINCI SI

## (undated) DEVICE — SYRINGE NDL 25GA 1ML L5/8IN BLU PLAS NDL S STL SHLD HYPO

## (undated) DEVICE — LEGGINGS, PAIR, 31X48, STERILE: Brand: MEDLINE

## (undated) DEVICE — CATHETER URETH 16FR BLLN 5CC SIL ALLY W/ SIL HYDRGEL 2 W F

## (undated) DEVICE — APPLICATOR SEAL FLOSEAL 5MM+ --

## (undated) DEVICE — SUTURE VCRL SZ 4-0 L18IN ABSRB UD L19MM PS-2 3/8 CIR PRIM J496H

## (undated) DEVICE — GOWN,REINF,POLY,ECL,PP SLV,XL: Brand: MEDLINE

## (undated) DEVICE — BASIC SINGLE BASIN-LF: Brand: MEDLINE INDUSTRIES, INC.

## (undated) DEVICE — SYR 50ML LR LCK 1ML GRAD NSAF --

## (undated) DEVICE — DRAPE INSTR ARM ROBOTIC ENDOWRIST DA VINCI S

## (undated) DEVICE — TRAY PREP DRY W/ PREM GLV 2 APPL 6 SPNG 2 UNDPD 1 OVERWRAP

## (undated) DEVICE — TIP COVER ACCESSORY

## (undated) DEVICE — DRAPE ROBOTIC CAM HD DISP ENDOWRIST DA VINCI SI

## (undated) DEVICE — (D)PREP SKN CHLRAPRP APPL 26ML -- CONVERT TO ITEM 371833

## (undated) DEVICE — SOLUTION LACTATED RINGERS INJECTION USP

## (undated) DEVICE — APPLICATOR ENDOSCP 40 CM W/ SNAP LCK DUPLOSPRAY MIS 0601130] BAXTER BIOSURGERY]

## (undated) DEVICE — DRAPE ROBOTIC CAM ARM DISP ENDOWRIST DA VINCI SI

## (undated) DEVICE — PK DISSECTING FORCEPS: Brand: ENDOWRIST;DAVINCI SI

## (undated) DEVICE — SUTURE MCRYL SZ 4-0 L18IN ABSRB UD L19MM PS-2 3/8 CIR PRIM Y496G

## (undated) DEVICE — SYR BULB 60ML IRRIGATION -- CONVERT TO ITEM 116413

## (undated) DEVICE — 2, DISPOSABLE SUCTION/IRRIGATOR WITHOUT DISPOSABLE TIP: Brand: STRYKEFLOW

## (undated) DEVICE — SUTURE SZ 0 27IN 5/8 CIR UR-6  TAPER PT VIOLET ABSRB VICRYL J603H

## (undated) DEVICE — SOLUTION IV 1000ML 0.9% SOD CHL

## (undated) DEVICE — SUTURE V-LOC 180 SZ 0 L9IN ABSRB GRN GS-21 L37MM 1/2 CIR VLOCL0346

## (undated) DEVICE — LOGICUT SCISSOR LENGTH 320MM: Brand: LOGI - LAPAROSCOPIC INSTRUMENT SYSTEM

## (undated) DEVICE — AIRSEAL 5 MM ACCESS PORT AND LOW PROFILE OBTURATOR WITH BLADELESS OPTICAL TIP, 120 MM LENGTH: Brand: AIRSEAL

## (undated) DEVICE — SEALANT FIBRIN 4 CC FRZN PRE FILLED SYR TISSEEL

## (undated) DEVICE — SOLUTION IRRIG 1000ML H2O STRL BLT

## (undated) DEVICE — COVER,TABLE,44X76,STERILE: Brand: MEDLINE